# Patient Record
Sex: MALE | Race: WHITE | NOT HISPANIC OR LATINO | Employment: FULL TIME | URBAN - METROPOLITAN AREA
[De-identification: names, ages, dates, MRNs, and addresses within clinical notes are randomized per-mention and may not be internally consistent; named-entity substitution may affect disease eponyms.]

---

## 2019-04-13 ENCOUNTER — HOSPITAL ENCOUNTER (OUTPATIENT)
Dept: URGENT CARE | Facility: CLINIC | Age: 49
Discharge: HOME | End: 2019-04-13

## 2021-04-16 ENCOUNTER — OFFICE VISIT CONVERTED (OUTPATIENT)
Dept: FAMILY MEDICINE CLINIC | Facility: CLINIC | Age: 51
End: 2021-04-16
Attending: NURSE PRACTITIONER

## 2021-04-16 ENCOUNTER — HOSPITAL ENCOUNTER (OUTPATIENT)
Dept: LAB | Facility: HOSPITAL | Age: 51
Discharge: HOME | End: 2021-04-16
Attending: NURSE PRACTITIONER

## 2021-04-16 ENCOUNTER — HOSPITAL ENCOUNTER (OUTPATIENT)
Dept: GENERAL RADIOLOGY | Facility: HOSPITAL | Age: 51
Discharge: HOME | End: 2021-04-16
Attending: NURSE PRACTITIONER

## 2021-04-16 LAB
ALBUMIN SERPL-MCNC: 4.3 G/DL (ref 3.5–5)
ALBUMIN/GLOB SERPL: 1.4 {RATIO} (ref 1.4–2.6)
ALP SERPL-CCNC: 78 U/L (ref 56–119)
ALT SERPL-CCNC: 22 U/L (ref 10–40)
ANION GAP SERPL CALC-SCNC: 21 MMOL/L (ref 8–19)
AST SERPL-CCNC: 22 U/L (ref 15–50)
BASOPHILS # BLD AUTO: 0.05 10*3/UL (ref 0–0.2)
BASOPHILS NFR BLD AUTO: 0.5 % (ref 0–3)
BILIRUB SERPL-MCNC: 0.32 MG/DL (ref 0.2–1.3)
BNP SERPL-MCNC: 11 PG/ML (ref 0–900)
BUN SERPL-MCNC: 16 MG/DL (ref 5–25)
BUN/CREAT SERPL: 23 {RATIO} (ref 6–20)
CALCIUM SERPL-MCNC: 9.1 MG/DL (ref 8.7–10.4)
CHLORIDE SERPL-SCNC: 107 MMOL/L (ref 99–111)
CHOLEST SERPL-MCNC: 224 MG/DL (ref 107–200)
CHOLEST/HDLC SERPL: 5.1 {RATIO} (ref 3–6)
CONV ABS IMM GRAN: 0.05 10*3/UL (ref 0–0.2)
CONV CO2: 20 MMOL/L (ref 22–32)
CONV IMMATURE GRAN: 0.5 % (ref 0–1.8)
CONV TOTAL PROTEIN: 7.4 G/DL (ref 6.3–8.2)
CREAT UR-MCNC: 0.7 MG/DL (ref 0.7–1.2)
DEPRECATED RDW RBC AUTO: 41.1 FL (ref 35.1–43.9)
EOSINOPHIL # BLD AUTO: 0.13 10*3/UL (ref 0–0.7)
EOSINOPHIL # BLD AUTO: 1.3 % (ref 0–7)
ERYTHROCYTE [DISTWIDTH] IN BLOOD BY AUTOMATED COUNT: 12.6 % (ref 11.6–14.4)
GFR SERPLBLD BASED ON 1.73 SQ M-ARVRAT: >60 ML/MIN/{1.73_M2}
GLOBULIN UR ELPH-MCNC: 3.1 G/DL (ref 2–3.5)
GLUCOSE SERPL-MCNC: 92 MG/DL (ref 70–99)
HCT VFR BLD AUTO: 46.4 % (ref 42–52)
HDLC SERPL-MCNC: 44 MG/DL (ref 40–60)
HGB BLD-MCNC: 15.5 G/DL (ref 14–18)
LDLC SERPL CALC-MCNC: 141 MG/DL (ref 70–100)
LYMPHOCYTES # BLD AUTO: 2.92 10*3/UL (ref 1–5)
LYMPHOCYTES NFR BLD AUTO: 30 % (ref 20–45)
MCH RBC QN AUTO: 29.8 PG (ref 27–31)
MCHC RBC AUTO-ENTMCNC: 33.4 G/DL (ref 33–37)
MCV RBC AUTO: 89.1 FL (ref 80–96)
MONOCYTES # BLD AUTO: 0.56 10*3/UL (ref 0.2–1.2)
MONOCYTES NFR BLD AUTO: 5.8 % (ref 3–10)
NEUTROPHILS # BLD AUTO: 6.02 10*3/UL (ref 2–8)
NEUTROPHILS NFR BLD AUTO: 61.9 % (ref 30–85)
NRBC CBCN: 0 % (ref 0–0.7)
OSMOLALITY SERPL CALC.SUM OF ELEC: 299 MOSM/KG (ref 273–304)
PLATELET # BLD AUTO: 336 10*3/UL (ref 130–400)
PMV BLD AUTO: 9.7 FL (ref 9.4–12.4)
POTASSIUM SERPL-SCNC: 4.3 MMOL/L (ref 3.5–5.3)
PSA SERPL-MCNC: 0.24 NG/ML (ref 0–4)
RBC # BLD AUTO: 5.21 10*6/UL (ref 4.7–6.1)
SODIUM SERPL-SCNC: 144 MMOL/L (ref 135–147)
TRIGL SERPL-MCNC: 197 MG/DL (ref 40–150)
TSH SERPL-ACNC: 1.76 M[IU]/L (ref 0.27–4.2)
VLDLC SERPL-MCNC: 39 MG/DL (ref 5–37)
WBC # BLD AUTO: 9.73 10*3/UL (ref 4.8–10.8)
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
## 2021-05-14 VITALS
RESPIRATION RATE: 18 BRPM | TEMPERATURE: 98.1 F | OXYGEN SATURATION: 96 % | BODY MASS INDEX: 40.69 KG/M2 | HEART RATE: 76 BPM | SYSTOLIC BLOOD PRESSURE: 140 MMHG | DIASTOLIC BLOOD PRESSURE: 76 MMHG | WEIGHT: 300.37 LBS | HEIGHT: 72 IN

## 2021-05-19 ENCOUNTER — HOSPITAL ENCOUNTER (OUTPATIENT)
Dept: CARDIOLOGY | Facility: HOSPITAL | Age: 51
Discharge: HOME | End: 2021-05-19
Attending: NURSE PRACTITIONER

## 2022-01-21 ENCOUNTER — OFFICE VISIT (OUTPATIENT)
Dept: FAMILY MEDICINE CLINIC | Facility: CLINIC | Age: 52
End: 2022-01-21

## 2022-01-21 VITALS
HEART RATE: 78 BPM | TEMPERATURE: 97.6 F | BODY MASS INDEX: 42.66 KG/M2 | SYSTOLIC BLOOD PRESSURE: 134 MMHG | DIASTOLIC BLOOD PRESSURE: 76 MMHG | OXYGEN SATURATION: 98 % | HEIGHT: 72 IN | WEIGHT: 315 LBS

## 2022-01-21 DIAGNOSIS — F17.200 TOBACCO USE DISORDER: ICD-10-CM

## 2022-01-21 DIAGNOSIS — J44.9 CHRONIC OBSTRUCTIVE PULMONARY DISEASE, UNSPECIFIED COPD TYPE: ICD-10-CM

## 2022-01-21 DIAGNOSIS — I10 PRIMARY HYPERTENSION: ICD-10-CM

## 2022-01-21 DIAGNOSIS — E78.2 MIXED HYPERLIPIDEMIA: ICD-10-CM

## 2022-01-21 DIAGNOSIS — R73.01 IMPAIRED FASTING GLUCOSE: ICD-10-CM

## 2022-01-21 DIAGNOSIS — K21.9 GASTROESOPHAGEAL REFLUX DISEASE WITHOUT ESOPHAGITIS: ICD-10-CM

## 2022-01-21 DIAGNOSIS — Z12.11 SCREENING FOR COLON CANCER: ICD-10-CM

## 2022-01-21 DIAGNOSIS — Z12.5 SCREENING FOR PROSTATE CANCER: ICD-10-CM

## 2022-01-21 DIAGNOSIS — R11.2 NAUSEA AND VOMITING, INTRACTABILITY OF VOMITING NOT SPECIFIED, UNSPECIFIED VOMITING TYPE: ICD-10-CM

## 2022-01-21 DIAGNOSIS — Z13.29 SCREENING FOR THYROID DISORDER: ICD-10-CM

## 2022-01-21 PROBLEM — E88.810 METABOLIC SYNDROME: Status: ACTIVE | Noted: 2022-01-21

## 2022-01-21 PROBLEM — N20.0 KIDNEY STONES: Status: ACTIVE | Noted: 2022-01-21

## 2022-01-21 PROBLEM — E88.81 METABOLIC SYNDROME: Status: ACTIVE | Noted: 2022-01-21

## 2022-01-21 LAB
ALBUMIN SERPL-MCNC: 4.6 G/DL (ref 3.5–5.2)
ALBUMIN/GLOB SERPL: 1.6 G/DL
ALP SERPL-CCNC: 68 U/L (ref 39–117)
ALT SERPL W P-5'-P-CCNC: 31 U/L (ref 1–41)
AMYLASE SERPL-CCNC: 46 U/L (ref 28–100)
ANION GAP SERPL CALCULATED.3IONS-SCNC: 9.5 MMOL/L (ref 5–15)
AST SERPL-CCNC: 28 U/L (ref 1–40)
BASOPHILS # BLD AUTO: 0.05 10*3/MM3 (ref 0–0.2)
BASOPHILS NFR BLD AUTO: 0.4 % (ref 0–1.5)
BILIRUB SERPL-MCNC: 0.5 MG/DL (ref 0–1.2)
BUN SERPL-MCNC: 13 MG/DL (ref 6–20)
BUN/CREAT SERPL: 16.9 (ref 7–25)
CALCIUM SPEC-SCNC: 9.3 MG/DL (ref 8.6–10.5)
CHLORIDE SERPL-SCNC: 102 MMOL/L (ref 98–107)
CHOLEST SERPL-MCNC: 240 MG/DL (ref 0–200)
CO2 SERPL-SCNC: 28.5 MMOL/L (ref 22–29)
CREAT SERPL-MCNC: 0.77 MG/DL (ref 0.76–1.27)
DEPRECATED RDW RBC AUTO: 40 FL (ref 37–54)
EOSINOPHIL # BLD AUTO: 0.11 10*3/MM3 (ref 0–0.4)
EOSINOPHIL NFR BLD AUTO: 0.9 % (ref 0.3–6.2)
ERYTHROCYTE [DISTWIDTH] IN BLOOD BY AUTOMATED COUNT: 12.3 % (ref 12.3–15.4)
GFR SERPL CREATININE-BSD FRML MDRD: 107 ML/MIN/1.73
GLOBULIN UR ELPH-MCNC: 2.9 GM/DL
GLUCOSE SERPL-MCNC: 90 MG/DL (ref 65–99)
HBA1C MFR BLD: 6.02 % (ref 4.8–5.6)
HCT VFR BLD AUTO: 44.6 % (ref 37.5–51)
HDLC SERPL-MCNC: 35 MG/DL (ref 40–60)
HGB BLD-MCNC: 15 G/DL (ref 13–17.7)
IMM GRANULOCYTES # BLD AUTO: 0.06 10*3/MM3 (ref 0–0.05)
IMM GRANULOCYTES NFR BLD AUTO: 0.5 % (ref 0–0.5)
LDLC SERPL CALC-MCNC: 162 MG/DL (ref 0–100)
LDLC/HDLC SERPL: 4.54 {RATIO}
LIPASE SERPL-CCNC: 20 U/L (ref 13–60)
LYMPHOCYTES # BLD AUTO: 3.54 10*3/MM3 (ref 0.7–3.1)
LYMPHOCYTES NFR BLD AUTO: 30.1 % (ref 19.6–45.3)
MCH RBC QN AUTO: 29.7 PG (ref 26.6–33)
MCHC RBC AUTO-ENTMCNC: 33.6 G/DL (ref 31.5–35.7)
MCV RBC AUTO: 88.3 FL (ref 79–97)
MONOCYTES # BLD AUTO: 0.62 10*3/MM3 (ref 0.1–0.9)
MONOCYTES NFR BLD AUTO: 5.3 % (ref 5–12)
NEUTROPHILS NFR BLD AUTO: 62.8 % (ref 42.7–76)
NEUTROPHILS NFR BLD AUTO: 7.4 10*3/MM3 (ref 1.7–7)
NRBC BLD AUTO-RTO: 0 /100 WBC (ref 0–0.2)
PLATELET # BLD AUTO: 349 10*3/MM3 (ref 140–450)
PMV BLD AUTO: 9.7 FL (ref 6–12)
POTASSIUM SERPL-SCNC: 4.3 MMOL/L (ref 3.5–5.2)
PROT SERPL-MCNC: 7.5 G/DL (ref 6–8.5)
PSA SERPL-MCNC: 0.19 NG/ML (ref 0–4)
RBC # BLD AUTO: 5.05 10*6/MM3 (ref 4.14–5.8)
SODIUM SERPL-SCNC: 140 MMOL/L (ref 136–145)
TRIGL SERPL-MCNC: 231 MG/DL (ref 0–150)
TSH SERPL DL<=0.05 MIU/L-ACNC: 1.44 UIU/ML (ref 0.27–4.2)
VLDLC SERPL-MCNC: 43 MG/DL (ref 5–40)
WBC NRBC COR # BLD: 11.78 10*3/MM3 (ref 3.4–10.8)

## 2022-01-21 PROCEDURE — 86677 HELICOBACTER PYLORI ANTIBODY: CPT | Performed by: NURSE PRACTITIONER

## 2022-01-21 PROCEDURE — 80061 LIPID PANEL: CPT | Performed by: NURSE PRACTITIONER

## 2022-01-21 PROCEDURE — 82150 ASSAY OF AMYLASE: CPT | Performed by: NURSE PRACTITIONER

## 2022-01-21 PROCEDURE — 83036 HEMOGLOBIN GLYCOSYLATED A1C: CPT | Performed by: NURSE PRACTITIONER

## 2022-01-21 PROCEDURE — G0103 PSA SCREENING: HCPCS | Performed by: NURSE PRACTITIONER

## 2022-01-21 PROCEDURE — 84443 ASSAY THYROID STIM HORMONE: CPT | Performed by: NURSE PRACTITIONER

## 2022-01-21 PROCEDURE — 36415 COLL VENOUS BLD VENIPUNCTURE: CPT | Performed by: NURSE PRACTITIONER

## 2022-01-21 PROCEDURE — 99214 OFFICE O/P EST MOD 30 MIN: CPT | Performed by: NURSE PRACTITIONER

## 2022-01-21 PROCEDURE — 83690 ASSAY OF LIPASE: CPT | Performed by: NURSE PRACTITIONER

## 2022-01-21 PROCEDURE — 80053 COMPREHEN METABOLIC PANEL: CPT | Performed by: NURSE PRACTITIONER

## 2022-01-21 PROCEDURE — 85025 COMPLETE CBC W/AUTO DIFF WBC: CPT | Performed by: NURSE PRACTITIONER

## 2022-01-21 RX ORDER — FAMOTIDINE 20 MG/1
20 TABLET, FILM COATED ORAL 2 TIMES DAILY PRN
Qty: 180 TABLET | Refills: 1 | Status: SHIPPED | OUTPATIENT
Start: 2022-01-21 | End: 2022-02-08

## 2022-01-21 NOTE — PATIENT INSTRUCTIONS
Gastroesophageal Reflux Disease, Adult    Gastroesophageal reflux (CHAMP) happens when acid from the stomach flows up into the tube that connects the mouth and the stomach (esophagus). Normally, food travels down the esophagus and stays in the stomach to be digested. With CHAMP, food and stomach acid sometimes move back up into the esophagus.  You may have a disease called gastroesophageal reflux disease (GERD) if the reflux:  · Happens often.  · Causes frequent or very bad symptoms.  · Causes problems such as damage to the esophagus.  When this happens, the esophagus becomes sore and swollen. Over time, GERD can make small holes (ulcers) in the lining of the esophagus.  What are the causes?  This condition is caused by a problem with the muscle between the esophagus and the stomach. When this muscle is weak or not normal, it does not close properly to keep food and acid from coming back up from the stomach.  The muscle can be weak because of:  · Tobacco use.  · Pregnancy.  · Having a certain type of hernia (hiatal hernia).  · Alcohol use.  · Certain foods and drinks, such as coffee, chocolate, onions, and peppermint.  What increases the risk?  · Being overweight.  · Having a disease that affects your connective tissue.  · Taking NSAIDs, such a ibuprofen.  What are the signs or symptoms?  · Heartburn.  · Difficult or painful swallowing.  · The feeling of having a lump in the throat.  · A bitter taste in the mouth.  · Bad breath.  · Having a lot of saliva.  · Having an upset or bloated stomach.  · Burping.  · Chest pain. Different conditions can cause chest pain. Make sure you see your doctor if you have chest pain.  · Shortness of breath or wheezing.  · A long-term cough or a cough at night.  · Wearing away of the surface of teeth (tooth enamel).  · Weight loss.  How is this treated?  · Making changes to your diet.  · Taking medicine.  · Having surgery.  Treatment will depend on how bad your symptoms are.  Follow these  instructions at home:  Eating and drinking    · Follow a diet as told by your doctor. You may need to avoid foods and drinks such as:  ? Coffee and tea, with or without caffeine.  ? Drinks that contain alcohol.  ? Energy drinks and sports drinks.  ? Bubbly (carbonated) drinks or sodas.  ? Chocolate and cocoa.  ? Peppermint and mint flavorings.  ? Garlic and onions.  ? Horseradish.  ? Spicy and acidic foods. These include peppers, chili powder, fuchs powder, vinegar, hot sauces, and BBQ sauce.  ? Citrus fruit juices and citrus fruits, such as oranges, chandrakant, and limes.  ? Tomato-based foods. These include red sauce, chili, salsa, and pizza with red sauce.  ? Fried and fatty foods. These include donuts, french fries, potato chips, and high-fat dressings.  ? High-fat meats. These include hot dogs, rib eye steak, sausage, ham, and banda.  ? High-fat dairy items, such as whole milk, butter, and cream cheese.  · Eat small meals often. Avoid eating large meals.  · Avoid drinking large amounts of liquid with your meals.  · Avoid eating meals during the 2-3 hours before bedtime.  · Avoid lying down right after you eat.  · Do not exercise right after you eat.    Lifestyle    · Do not smoke or use any products that contain nicotine or tobacco. If you need help quitting, ask your doctor.  · Try to lower your stress. If you need help doing this, ask your doctor.  · If you are overweight, lose an amount of weight that is healthy for you. Ask your doctor about a safe weight loss goal.    General instructions  · Pay attention to any changes in your symptoms.  · Take over-the-counter and prescription medicines only as told by your doctor.  · Do not take aspirin, ibuprofen, or other NSAIDs unless your doctor says it is okay.  · Wear loose clothes. Do not wear anything tight around your waist.  · Raise (elevate) the head of your bed about 6 inches (15 cm). You may need to use a wedge to do this.  · Avoid bending over if this makes  your symptoms worse.  · Keep all follow-up visits.  Contact a doctor if:  · You have new symptoms.  · You lose weight and you do not know why.  · You have trouble swallowing or it hurts to swallow.  · You have wheezing or a cough that keeps happening.  · You have a hoarse voice.  · Your symptoms do not get better with treatment.  Get help right away if:  · You have sudden pain in your arms, neck, jaw, teeth, or back.  · You suddenly feel sweaty, dizzy, or light-headed.  · You have chest pain or shortness of breath.  · You vomit and the vomit is green, yellow, or black, or it looks like blood or coffee grounds.  · You faint.  · Your poop (stool) is red, bloody, or black.  · You cannot swallow, drink, or eat.  These symptoms may represent a serious problem that is an emergency. Do not wait to see if the symptoms will go away. Get medical help right away. Call your local emergency services (911 in the [place] Do not drive yourself to the hospital.  Summary  · If a person has gastroesophageal reflux disease (GERD), food and stomach acid move back up into the esophagus and cause symptoms or problems such as damage to the esophagus.  · Treatment will depend on how bad your symptoms are.  · Follow a diet as told by your doctor.  · Take all medicines only as told by your doctor.  This information is not intended to replace advice given to you by your health care provider. Make sure you discuss any questions you have with your health care provider.  Document Revised: 06/28/2021 Document Reviewed: 06/28/2021  [employer] Patient Education © 2021 [employer]

## 2022-01-21 NOTE — PROGRESS NOTES
Follow Up Office Visit      Patient Name: Joseph Duane Green  : 1970   MRN: [id number]     Chief Complaint:    Chief Complaint   Patient presents with   • Anxiety   • Hypertension   • Allergies   • Depression   • Hyperlipidemia       History of Present Illness: Joseph Duane Green is a 51 y.o. male who is here today to follow up for HTN, hyperlipidemia, impaired fasting glucose, reflux anxiety and depression        Colonoscopy-never    Labs-    Subjective      Review of Systems:   Review of Systems   Constitutional: Negative for chills and fever.   HENT: Negative for ear pain, sinus pain and sore throat.    Respiratory: Negative for cough.    Cardiovascular: Negative for chest pain.   Gastrointestinal: Positive for nausea and vomiting. Negative for abdominal pain and diarrhea.   Genitourinary: Negative for dysuria.   Musculoskeletal: Negative for myalgias.   Neurological: Negative for headaches.        Past Medical History:   Past Medical History:   Diagnosis Date   • Allergic    • Anxiety    • Depression    • Hypertension    • Obesity        Past Surgical History: History reviewed. No pertinent surgical history.    Family History:   Family History   Problem Relation Age of Onset   • Heart disease Mother    • Hypertension Mother    • Cancer Maternal Grandfather    • Heart disease Paternal Grandmother        Social History:   Social History     Socioeconomic History   • Marital status: Single   Tobacco Use   • Smoking status: Former Smoker     Types: Cigarettes   • Smokeless tobacco: Never Used   Substance and Sexual Activity   • Alcohol use: Not Currently       Medications:     Current Outpatient Medications:   •  famotidine (Pepcid) 20 MG tablet, Take 1 tablet by mouth 2 (Two) Times a Day As Needed for Indigestion or Heartburn., Disp: 180 tablet, Rfl: 1    Allergies:   Allergies   Allergen Reactions   • Adhesive Tape Itching           Objective     Physical Exam:  Vital Signs:   Vitals:    22  "1359   BP: 134/76   Pulse: 78   Temp: 97.6 °F (36.4 °C)   SpO2: 98%   Weight: (!) 145 kg (320 lb)   Height: 182.9 cm (72\")     Body mass index is 43.4 kg/m².     Physical Exam  HENT:      Right Ear: Tympanic membrane normal.      Left Ear: Tympanic membrane normal.      Nose: Nose normal.      Mouth/Throat:      Mouth: Mucous membranes are moist.   Eyes:      Pupils: Pupils are equal, round, and reactive to light.   Neck:      Vascular: No carotid bruit.   Cardiovascular:      Rate and Rhythm: Normal rate and regular rhythm.      Pulses: Normal pulses.      Heart sounds: Normal heart sounds.   Pulmonary:      Effort: Pulmonary effort is normal.      Breath sounds: Normal breath sounds.   Abdominal:      General: Bowel sounds are normal.      Palpations: Abdomen is soft.   Musculoskeletal:      Right lower leg: No edema.      Left lower leg: No edema.   Neurological:      Mental Status: He is alert.   Psychiatric:         Mood and Affect: Mood normal.         Behavior: Behavior normal.             Assessment / Plan      Assessment/Plan:   Diagnoses and all orders for this visit:    1. Primary hypertension  -     CBC Auto Differential  -     Comprehensive Metabolic Panel    2. Mixed hyperlipidemia  -     Comprehensive Metabolic Panel  -     Lipid Panel    3. Impaired fasting glucose  -     Hemoglobin A1c    4. Screening for prostate cancer  -     PSA Screen    5. Screening for colon cancer  -     Ambulatory Referral For Screening Colonoscopy    6. Screening for thyroid disorder  -     TSH    7. Chronic obstructive pulmonary disease, unspecified COPD type (HCC)    8. Tobacco use disorder    9. Gastroesophageal reflux disease without esophagitis  -     Ambulatory Referral to General Surgery  -     Amylase  -     Lipase  -     Helicobacter Pylori, IgA IgG IgM    10. Nausea and vomiting, intractability of vomiting not specified, unspecified vomiting type  -     Amylase  -     Lipase  -     Helicobacter Pylori, IgA IgG " "IgM    Other orders  -     famotidine (Pepcid) 20 MG tablet; Take 1 tablet by mouth 2 (Two) Times a Day As Needed for Indigestion or Heartburn.  Dispense: 180 tablet; Refill: 1       Hypertension controlled without medication at this time patient is previously controlled with diet and exercise continue exercise 30 minutes daily continue weight loss journey goal of losing 10% of his body weight  Hyperlipidemia we will obtain lipid panel to monitor recommend decrease fried foods red meat pork products cheese increase fruits vegetables whole grains  Impaired fasting glucose obtain hemoglobin A1c to monitor recommend decrease carb intake increase exercise to 30 minutes daily  Reflux occurring a few times per week we will add Pepcid 20 mg twice daily as needed will obtain labs call with results we will provide order for general surgery for endoscopy screening colonoscopy  COPD stable no wheezing no use of inhalers at this time        Follow Up:   Return in about 6 months (around 7/21/2022).    SAV Shah    \"Please note that portions of this note were completed with a voice recognition program.\"    "

## 2022-01-24 LAB
H PYLORI IGA SER-ACNC: <9 UNITS (ref 0–8.9)
H PYLORI IGG SER IA-ACNC: 0.43 INDEX VALUE (ref 0–0.79)
H PYLORI IGM SER-ACNC: <9 UNITS (ref 0–8.9)

## 2022-01-26 ENCOUNTER — TELEPHONE (OUTPATIENT)
Dept: FAMILY MEDICINE CLINIC | Facility: CLINIC | Age: 52
End: 2022-01-26

## 2022-01-26 NOTE — TELEPHONE ENCOUNTER
Notified patient of results and made a 3 month follow up

## 2022-01-26 NOTE — TELEPHONE ENCOUNTER
----- Message from SAV Ye sent at 1/24/2022  8:52 AM EST -----  The 10-year ASCVD risk score (Meme TORRES Jr., et al., 2013) is: 7.6%    Values used to calculate the score:      Age: 51 years      Sex: Male      Is Non- : No      Diabetic: No      Tobacco smoker: No      Systolic Blood Pressure: 134 mmHg      Is BP treated: No      HDL Cholesterol: 35 mg/dL      Total Cholesterol: 240 mg/dL    Cholesterol is elevated continues to rise LDL or bad cholesterol is 162 recommend cut out fried foods red meat pork cheese increase fruits vegetables whole grains beans nuts recheck in 90 days if remains elevated will start Lipitor 20 mg at nighttime recheck CBC CMP and lipid panel in 90 daysAverage blood sugar elevated at risk for diabetes but does not develop diabetes at this time recheck hemoglobin A1c in 90 days with a follow-up appointment in 90 days

## 2022-04-15 ENCOUNTER — OFFICE VISIT (OUTPATIENT)
Dept: FAMILY MEDICINE CLINIC | Facility: CLINIC | Age: 52
End: 2022-04-15

## 2022-04-15 VITALS
SYSTOLIC BLOOD PRESSURE: 123 MMHG | HEIGHT: 72 IN | DIASTOLIC BLOOD PRESSURE: 83 MMHG | HEART RATE: 70 BPM | TEMPERATURE: 97.6 F | BODY MASS INDEX: 42.66 KG/M2 | OXYGEN SATURATION: 99 % | WEIGHT: 315 LBS

## 2022-04-15 DIAGNOSIS — I10 PRIMARY HYPERTENSION: ICD-10-CM

## 2022-04-15 DIAGNOSIS — E88.81 METABOLIC SYNDROME: ICD-10-CM

## 2022-04-15 DIAGNOSIS — Z11.59 NEED FOR HEPATITIS C SCREENING TEST: ICD-10-CM

## 2022-04-15 DIAGNOSIS — R73.01 IMPAIRED FASTING GLUCOSE: ICD-10-CM

## 2022-04-15 DIAGNOSIS — E78.2 MIXED HYPERLIPIDEMIA: ICD-10-CM

## 2022-04-15 DIAGNOSIS — Z12.11 SCREENING FOR MALIGNANT NEOPLASM OF COLON: ICD-10-CM

## 2022-04-15 DIAGNOSIS — M54.12 CERVICAL RADICULITIS: ICD-10-CM

## 2022-04-15 DIAGNOSIS — Z13.29 SCREENING FOR THYROID DISORDER: ICD-10-CM

## 2022-04-15 DIAGNOSIS — M50.30 DDD (DEGENERATIVE DISC DISEASE), CERVICAL: ICD-10-CM

## 2022-04-15 DIAGNOSIS — Z79.899 MEDICATION MANAGEMENT: ICD-10-CM

## 2022-04-15 DIAGNOSIS — M54.2 NECK PAIN: ICD-10-CM

## 2022-04-15 LAB
ALBUMIN SERPL-MCNC: 4.5 G/DL (ref 3.5–5.2)
ALBUMIN/GLOB SERPL: 1.8 G/DL
ALP SERPL-CCNC: 70 U/L (ref 39–117)
ALT SERPL W P-5'-P-CCNC: 27 U/L (ref 1–41)
AMPHET+METHAMPHET UR QL: NEGATIVE
ANION GAP SERPL CALCULATED.3IONS-SCNC: 14 MMOL/L (ref 5–15)
AST SERPL-CCNC: 24 U/L (ref 1–40)
BARBITURATES UR QL SCN: NEGATIVE
BASOPHILS # BLD AUTO: 0.06 10*3/MM3 (ref 0–0.2)
BASOPHILS NFR BLD AUTO: 0.7 % (ref 0–1.5)
BENZODIAZ UR QL SCN: NEGATIVE
BILIRUB SERPL-MCNC: 0.3 MG/DL (ref 0–1.2)
BUN SERPL-MCNC: 13 MG/DL (ref 6–20)
BUN/CREAT SERPL: 19.4 (ref 7–25)
CALCIUM SPEC-SCNC: 8.8 MG/DL (ref 8.6–10.5)
CANNABINOIDS SERPL QL: NEGATIVE
CHLORIDE SERPL-SCNC: 105 MMOL/L (ref 98–107)
CHOLEST SERPL-MCNC: 216 MG/DL (ref 0–200)
CO2 SERPL-SCNC: 21 MMOL/L (ref 22–29)
COCAINE UR QL: NEGATIVE
CREAT SERPL-MCNC: 0.67 MG/DL (ref 0.76–1.27)
DEPRECATED RDW RBC AUTO: 37.9 FL (ref 37–54)
EGFRCR SERPLBLD CKD-EPI 2021: 112.3 ML/MIN/1.73
EOSINOPHIL # BLD AUTO: 0.08 10*3/MM3 (ref 0–0.4)
EOSINOPHIL NFR BLD AUTO: 0.9 % (ref 0.3–6.2)
ERYTHROCYTE [DISTWIDTH] IN BLOOD BY AUTOMATED COUNT: 12.3 % (ref 12.3–15.4)
GLOBULIN UR ELPH-MCNC: 2.5 GM/DL
GLUCOSE SERPL-MCNC: 92 MG/DL (ref 65–99)
HBA1C MFR BLD: 5.4 % (ref 4.8–5.6)
HCT VFR BLD AUTO: 45 % (ref 37.5–51)
HCV AB SER DONR QL: NORMAL
HDLC SERPL-MCNC: 39 MG/DL (ref 40–60)
HGB BLD-MCNC: 15.5 G/DL (ref 13–17.7)
IMM GRANULOCYTES # BLD AUTO: 0.06 10*3/MM3 (ref 0–0.05)
IMM GRANULOCYTES NFR BLD AUTO: 0.7 % (ref 0–0.5)
LDLC SERPL CALC-MCNC: 141 MG/DL (ref 0–100)
LDLC/HDLC SERPL: 3.53 {RATIO}
LYMPHOCYTES # BLD AUTO: 2.46 10*3/MM3 (ref 0.7–3.1)
LYMPHOCYTES NFR BLD AUTO: 27.7 % (ref 19.6–45.3)
MCH RBC QN AUTO: 29.6 PG (ref 26.6–33)
MCHC RBC AUTO-ENTMCNC: 34.4 G/DL (ref 31.5–35.7)
MCV RBC AUTO: 85.9 FL (ref 79–97)
METHADONE UR QL SCN: NEGATIVE
MONOCYTES # BLD AUTO: 0.57 10*3/MM3 (ref 0.1–0.9)
MONOCYTES NFR BLD AUTO: 6.4 % (ref 5–12)
NEUTROPHILS NFR BLD AUTO: 5.65 10*3/MM3 (ref 1.7–7)
NEUTROPHILS NFR BLD AUTO: 63.6 % (ref 42.7–76)
NRBC BLD AUTO-RTO: 0 /100 WBC (ref 0–0.2)
OPIATES UR QL: NEGATIVE
OXYCODONE UR QL SCN: NEGATIVE
PLATELET # BLD AUTO: 341 10*3/MM3 (ref 140–450)
PMV BLD AUTO: 9.6 FL (ref 6–12)
POTASSIUM SERPL-SCNC: 4.6 MMOL/L (ref 3.5–5.2)
PROT SERPL-MCNC: 7 G/DL (ref 6–8.5)
RBC # BLD AUTO: 5.24 10*6/MM3 (ref 4.14–5.8)
SODIUM SERPL-SCNC: 140 MMOL/L (ref 136–145)
TRIGL SERPL-MCNC: 197 MG/DL (ref 0–150)
TSH SERPL DL<=0.05 MIU/L-ACNC: 1.87 UIU/ML (ref 0.27–4.2)
VLDLC SERPL-MCNC: 36 MG/DL (ref 5–40)
WBC NRBC COR # BLD: 8.88 10*3/MM3 (ref 3.4–10.8)

## 2022-04-15 PROCEDURE — 80307 DRUG TEST PRSMV CHEM ANLYZR: CPT | Performed by: NURSE PRACTITIONER

## 2022-04-15 PROCEDURE — 99213 OFFICE O/P EST LOW 20 MIN: CPT | Performed by: NURSE PRACTITIONER

## 2022-04-15 PROCEDURE — 80053 COMPREHEN METABOLIC PANEL: CPT | Performed by: NURSE PRACTITIONER

## 2022-04-15 PROCEDURE — 84443 ASSAY THYROID STIM HORMONE: CPT | Performed by: NURSE PRACTITIONER

## 2022-04-15 PROCEDURE — 85025 COMPLETE CBC W/AUTO DIFF WBC: CPT | Performed by: NURSE PRACTITIONER

## 2022-04-15 PROCEDURE — 36415 COLL VENOUS BLD VENIPUNCTURE: CPT | Performed by: NURSE PRACTITIONER

## 2022-04-15 PROCEDURE — 86803 HEPATITIS C AB TEST: CPT | Performed by: NURSE PRACTITIONER

## 2022-04-15 PROCEDURE — 83036 HEMOGLOBIN GLYCOSYLATED A1C: CPT | Performed by: NURSE PRACTITIONER

## 2022-04-15 PROCEDURE — 80061 LIPID PANEL: CPT | Performed by: NURSE PRACTITIONER

## 2022-04-15 RX ORDER — GABAPENTIN 300 MG/1
300 CAPSULE ORAL 3 TIMES DAILY
Qty: 270 CAPSULE | Refills: 1 | Status: SHIPPED | OUTPATIENT
Start: 2022-04-15 | End: 2023-03-21 | Stop reason: SDUPTHER

## 2022-04-15 RX ORDER — MELOXICAM 15 MG/1
15 TABLET ORAL DAILY
Qty: 90 TABLET | Refills: 1 | Status: SHIPPED | OUTPATIENT
Start: 2022-04-15 | End: 2023-03-21 | Stop reason: SDUPTHER

## 2022-04-15 NOTE — PROGRESS NOTES
Follow Up Office Visit      Patient Name: Joseph Duane Green  : 1970   MRN: [id number]     Chief Complaint:    Chief Complaint   Patient presents with   • Hypertension   • Hyperlipidemia   • Anxiety   • Arthritis   • Depression   • Hyperglycemia   • Neck Pain       History of Present Illness: Joseph Duane Green is a 52 y.o. male who is here today to  Follow up for anxiety, hypertension, allergies, anxiety, hyperlipidemia, IFG, neck pain and depression    Pt requests to be placed back on gabapentin for neck pain and radiculitis its been at least 4 years since taken this medication     Also refill of mobic for joint pain and neck pain     Colonoscopy none   PSA     Subjective      Review of Systems:   Review of Systems   Constitutional: Negative for fatigue.   HENT: Negative for ear pain, sinus pain and sore throat.    Eyes: Negative for visual disturbance.   Respiratory: Negative for cough and shortness of breath.    Cardiovascular: Negative for chest pain.   Gastrointestinal: Negative for vomiting.   Endocrine: Negative for polydipsia and polyuria.   Genitourinary: Negative for dysuria.   Musculoskeletal: Positive for neck pain.   Allergic/Immunologic: Positive for environmental allergies.   Neurological: Positive for numbness.   Psychiatric/Behavioral: The patient is not nervous/anxious.         Past Medical History:   Past Medical History:   Diagnosis Date   • Allergic    • Anxiety    • Depression    • GERD (gastroesophageal reflux disease)    • Hypertension    • Obesity        Past Surgical History:   Past Surgical History:   Procedure Laterality Date   • FRACTURE SURGERY Right     leg       Family History:   Family History   Problem Relation Age of Onset   • Heart disease Mother    • Hypertension Mother    • Cancer Maternal Grandfather    • Heart disease Paternal Grandmother        Social History:   Social History     Socioeconomic History   • Marital status: Single   Tobacco Use   • Smoking  "status: Former Smoker     Types: Cigarettes   • Smokeless tobacco: Never Used   Vaping Use   • Vaping Use: Never used   Substance and Sexual Activity   • Alcohol use: Not Currently       Medications:     Current Outpatient Medications:   •  meloxicam (MOBIC) 15 MG tablet, Take 1 tablet by mouth Daily., Disp: 90 tablet, Rfl: 1  •  gabapentin (NEURONTIN) 300 MG capsule, Take 1 capsule by mouth 3 (Three) Times a Day., Disp: 270 capsule, Rfl: 1    Allergies:   Allergies   Allergen Reactions   • Adhesive Tape Itching           Objective     Physical Exam:  Vital Signs:   Vitals:    04/15/22 0803   BP: 123/83   Pulse: 70   Temp: 97.6 °F (36.4 °C)   SpO2: 99%   Weight: (!) 145 kg (320 lb)   Height: 182.9 cm (72\")     Body mass index is 43.4 kg/m².     Physical Exam  HENT:      Right Ear: Tympanic membrane normal.      Left Ear: Tympanic membrane normal.      Nose: Nose normal.      Mouth/Throat:      Mouth: Mucous membranes are moist.   Eyes:      Conjunctiva/sclera: Conjunctivae normal.   Neck:      Vascular: No carotid bruit.     Cardiovascular:      Rate and Rhythm: Normal rate and regular rhythm.      Heart sounds: Normal heart sounds. No murmur heard.  Pulmonary:      Effort: Pulmonary effort is normal.      Breath sounds: Normal breath sounds.   Abdominal:      General: Bowel sounds are normal.      Palpations: Abdomen is soft.   Musculoskeletal:      Cervical back: Muscular tenderness present.   Neurological:      Mental Status: He is alert.             Assessment / Plan      Assessment/Plan:   Diagnoses and all orders for this visit:    1. Impaired fasting glucose  -     Hemoglobin A1c    2. Mixed hyperlipidemia  -     Comprehensive Metabolic Panel  -     Lipid Panel    3. Primary hypertension  -     CBC Auto Differential    4. Metabolic syndrome    5. Neck pain  -     meloxicam (MOBIC) 15 MG tablet; Take 1 tablet by mouth Daily.  Dispense: 90 tablet; Refill: 1  -     gabapentin (NEURONTIN) 300 MG capsule; Take 1 " "capsule by mouth 3 (Three) Times a Day.  Dispense: 270 capsule; Refill: 1    6. Cervical radiculitis  -     gabapentin (NEURONTIN) 300 MG capsule; Take 1 capsule by mouth 3 (Three) Times a Day.  Dispense: 270 capsule; Refill: 1    7. DDD (degenerative disc disease), cervical    8. Medication management  -     Urine Drug Screen - Urine, Clean Catch; Future  -     Urine Drug Screen - Urine, Clean Catch    9. Screening for thyroid disorder  -     TSH    10. Screening for malignant neoplasm of colon  -     Ambulatory Referral For Screening Colonoscopy    11. Need for hepatitis C screening test  -     Hepatitis C Antibody       Impaired fasting glucose obtain hemoglobin A1c to monitor recommend decrease carb intake exercise 30 minutes daily  Hyperlipidemia we will obtain lipid  recommend decrease fried foods red meat pork products exercise 30 minutes daily weight loss  Neck pain cervical radiculitis will provide refill gabapentin Gino reviewed urine drug screen obtained in office will also provide refill of meloxicam as needed use only take with food            Follow Up:   Return in about 6 months (around 10/15/2022).    Zonia Barnes, SAV    \"Please note that portions of this note were completed with a voice recognition program.\"    "

## 2022-09-30 PROCEDURE — U0004 COV-19 TEST NON-CDC HGH THRU: HCPCS | Performed by: FAMILY MEDICINE

## 2022-12-15 DIAGNOSIS — M54.12 CERVICAL RADICULITIS: ICD-10-CM

## 2022-12-15 DIAGNOSIS — M54.2 NECK PAIN: ICD-10-CM

## 2022-12-16 RX ORDER — GABAPENTIN 300 MG/1
CAPSULE ORAL
Qty: 270 CAPSULE | Refills: 1 | OUTPATIENT
Start: 2022-12-16

## 2023-03-21 ENCOUNTER — HOSPITAL ENCOUNTER (OUTPATIENT)
Dept: GENERAL RADIOLOGY | Facility: HOSPITAL | Age: 53
Discharge: HOME | End: 2023-03-21
Admitting: NURSE PRACTITIONER
Payer: COMMERCIAL

## 2023-03-21 ENCOUNTER — OFFICE VISIT (OUTPATIENT)
Dept: FAMILY MEDICINE CLINIC | Facility: CLINIC | Age: 53
End: 2023-03-21
Payer: COMMERCIAL

## 2023-03-21 VITALS
DIASTOLIC BLOOD PRESSURE: 68 MMHG | HEART RATE: 75 BPM | TEMPERATURE: 97.9 F | SYSTOLIC BLOOD PRESSURE: 127 MMHG | OXYGEN SATURATION: 97 % | HEIGHT: 72 IN | WEIGHT: 315 LBS | BODY MASS INDEX: 42.66 KG/M2

## 2023-03-21 DIAGNOSIS — K21.9 GASTROESOPHAGEAL REFLUX DISEASE WITHOUT ESOPHAGITIS: ICD-10-CM

## 2023-03-21 DIAGNOSIS — J44.9 CHRONIC OBSTRUCTIVE PULMONARY DISEASE, UNSPECIFIED COPD TYPE: ICD-10-CM

## 2023-03-21 DIAGNOSIS — J30.9 ALLERGIC RHINITIS, UNSPECIFIED SEASONALITY, UNSPECIFIED TRIGGER: ICD-10-CM

## 2023-03-21 DIAGNOSIS — Z13.29 SCREENING FOR THYROID DISORDER: ICD-10-CM

## 2023-03-21 DIAGNOSIS — M54.12 CERVICAL RADICULITIS: ICD-10-CM

## 2023-03-21 DIAGNOSIS — M54.2 NECK PAIN: ICD-10-CM

## 2023-03-21 DIAGNOSIS — Z79.899 MEDICATION MANAGEMENT: ICD-10-CM

## 2023-03-21 DIAGNOSIS — I10 PRIMARY HYPERTENSION: ICD-10-CM

## 2023-03-21 DIAGNOSIS — Z12.11 SCREENING FOR MALIGNANT NEOPLASM OF COLON: ICD-10-CM

## 2023-03-21 DIAGNOSIS — E66.01 CLASS 3 SEVERE OBESITY DUE TO EXCESS CALORIES WITH SERIOUS COMORBIDITY AND BODY MASS INDEX (BMI) OF 40.0 TO 44.9 IN ADULT: ICD-10-CM

## 2023-03-21 DIAGNOSIS — R73.01 IMPAIRED FASTING GLUCOSE: ICD-10-CM

## 2023-03-21 DIAGNOSIS — E78.2 MIXED HYPERLIPIDEMIA: Primary | ICD-10-CM

## 2023-03-21 DIAGNOSIS — M25.512 ACUTE PAIN OF LEFT SHOULDER: ICD-10-CM

## 2023-03-21 DIAGNOSIS — Z87.891 HISTORY OF SMOKING 30 OR MORE PACK YEARS: ICD-10-CM

## 2023-03-21 DIAGNOSIS — Z12.5 SCREENING PSA (PROSTATE SPECIFIC ANTIGEN): ICD-10-CM

## 2023-03-21 DIAGNOSIS — M50.30 DDD (DEGENERATIVE DISC DISEASE), CERVICAL: ICD-10-CM

## 2023-03-21 LAB
ALBUMIN SERPL-MCNC: 4.4 G/DL (ref 3.5–5.2)
ALBUMIN/GLOB SERPL: 1.5 G/DL
ALP SERPL-CCNC: 69 U/L (ref 39–117)
ALT SERPL W P-5'-P-CCNC: 38 U/L (ref 1–41)
AMPHET+METHAMPHET UR QL: NEGATIVE
AMPHETAMINE INTERNAL CONTROL: NORMAL
AMPHETAMINES UR QL: NEGATIVE
ANION GAP SERPL CALCULATED.3IONS-SCNC: 11 MMOL/L (ref 5–15)
AST SERPL-CCNC: 38 U/L (ref 1–40)
BARBITURATE INTERNAL CONTROL: NORMAL
BARBITURATES UR QL SCN: NEGATIVE
BASOPHILS # BLD AUTO: 0.03 10*3/MM3 (ref 0–0.2)
BASOPHILS NFR BLD AUTO: 0.3 % (ref 0–1.5)
BENZODIAZ UR QL SCN: NEGATIVE
BENZODIAZEPINE INTERNAL CONTROL: NORMAL
BILIRUB SERPL-MCNC: 0.5 MG/DL (ref 0–1.2)
BILIRUB UR QL STRIP: NEGATIVE
BUN SERPL-MCNC: 14 MG/DL (ref 6–20)
BUN/CREAT SERPL: 17.3 (ref 7–25)
BUPRENORPHINE INTERNAL CONTROL: NORMAL
BUPRENORPHINE SERPL-MCNC: NEGATIVE NG/ML
CALCIUM SPEC-SCNC: 9.3 MG/DL (ref 8.6–10.5)
CANNABINOIDS SERPL QL: NEGATIVE
CHLORIDE SERPL-SCNC: 101 MMOL/L (ref 98–107)
CHOLEST SERPL-MCNC: 233 MG/DL (ref 0–200)
CLARITY UR: CLEAR
CO2 SERPL-SCNC: 26 MMOL/L (ref 22–29)
COCAINE INTERNAL CONTROL: NORMAL
COCAINE UR QL: NEGATIVE
COLOR UR: YELLOW
CREAT SERPL-MCNC: 0.81 MG/DL (ref 0.76–1.27)
DEPRECATED RDW RBC AUTO: 40.2 FL (ref 37–54)
EGFRCR SERPLBLD CKD-EPI 2021: 106.1 ML/MIN/1.73
EOSINOPHIL # BLD AUTO: 0.07 10*3/MM3 (ref 0–0.4)
EOSINOPHIL NFR BLD AUTO: 0.8 % (ref 0.3–6.2)
ERYTHROCYTE [DISTWIDTH] IN BLOOD BY AUTOMATED COUNT: 12.7 % (ref 12.3–15.4)
EXPIRATION DATE: NORMAL
GLOBULIN UR ELPH-MCNC: 3 GM/DL
GLUCOSE SERPL-MCNC: 91 MG/DL (ref 65–99)
GLUCOSE UR STRIP-MCNC: NEGATIVE MG/DL
HBA1C MFR BLD: 5.6 % (ref 4.8–5.6)
HCT VFR BLD AUTO: 45.8 % (ref 37.5–51)
HDLC SERPL-MCNC: 35 MG/DL (ref 40–60)
HGB BLD-MCNC: 15.1 G/DL (ref 13–17.7)
HGB UR QL STRIP.AUTO: NEGATIVE
IMM GRANULOCYTES # BLD AUTO: 0.05 10*3/MM3 (ref 0–0.05)
IMM GRANULOCYTES NFR BLD AUTO: 0.6 % (ref 0–0.5)
KETONES UR QL STRIP: NEGATIVE
LDLC SERPL CALC-MCNC: 154 MG/DL (ref 0–100)
LDLC/HDLC SERPL: 4.3 {RATIO}
LEUKOCYTE ESTERASE UR QL STRIP.AUTO: NEGATIVE
LYMPHOCYTES # BLD AUTO: 2.62 10*3/MM3 (ref 0.7–3.1)
LYMPHOCYTES NFR BLD AUTO: 29.9 % (ref 19.6–45.3)
Lab: NORMAL
MCH RBC QN AUTO: 29 PG (ref 26.6–33)
MCHC RBC AUTO-ENTMCNC: 33 G/DL (ref 31.5–35.7)
MCV RBC AUTO: 87.9 FL (ref 79–97)
MDMA (ECSTASY) INTERNAL CONTROL: NORMAL
MDMA UR QL SCN: NEGATIVE
METHADONE INTERNAL CONTROL: NORMAL
METHADONE UR QL SCN: NEGATIVE
METHAMPHETAMINE INTERNAL CONTROL: NORMAL
MONOCYTES # BLD AUTO: 0.49 10*3/MM3 (ref 0.1–0.9)
MONOCYTES NFR BLD AUTO: 5.6 % (ref 5–12)
NEUTROPHILS NFR BLD AUTO: 5.51 10*3/MM3 (ref 1.7–7)
NEUTROPHILS NFR BLD AUTO: 62.8 % (ref 42.7–76)
NITRITE UR QL STRIP: NEGATIVE
NRBC BLD AUTO-RTO: 0 /100 WBC (ref 0–0.2)
OPIATES INTERNAL CONTROL: NORMAL
OPIATES UR QL: NEGATIVE
OXYCODONE INTERNAL CONTROL: NORMAL
OXYCODONE UR QL SCN: NEGATIVE
PCP UR QL SCN: NEGATIVE
PH UR STRIP.AUTO: 5.5 [PH] (ref 5–8)
PHENCYCLIDINE INTERNAL CONTROL: NORMAL
PLATELET # BLD AUTO: 361 10*3/MM3 (ref 140–450)
PMV BLD AUTO: 9.8 FL (ref 6–12)
POTASSIUM SERPL-SCNC: 4.6 MMOL/L (ref 3.5–5.2)
PROT SERPL-MCNC: 7.4 G/DL (ref 6–8.5)
PROT UR QL STRIP: NEGATIVE
PSA SERPL-MCNC: 0.23 NG/ML (ref 0–4)
RBC # BLD AUTO: 5.21 10*6/MM3 (ref 4.14–5.8)
SODIUM SERPL-SCNC: 138 MMOL/L (ref 136–145)
SP GR UR STRIP: 1.01 (ref 1–1.03)
THC INTERNAL CONTROL: NORMAL
TRIGL SERPL-MCNC: 238 MG/DL (ref 0–150)
TSH SERPL DL<=0.05 MIU/L-ACNC: 2.18 UIU/ML (ref 0.27–4.2)
UROBILINOGEN UR QL STRIP: NORMAL
VLDLC SERPL-MCNC: 44 MG/DL (ref 5–40)
WBC NRBC COR # BLD: 8.77 10*3/MM3 (ref 3.4–10.8)

## 2023-03-21 PROCEDURE — G0103 PSA SCREENING: HCPCS | Performed by: NURSE PRACTITIONER

## 2023-03-21 PROCEDURE — 81003 URINALYSIS AUTO W/O SCOPE: CPT | Performed by: NURSE PRACTITIONER

## 2023-03-21 PROCEDURE — 73030 X-RAY EXAM OF SHOULDER: CPT

## 2023-03-21 PROCEDURE — 80061 LIPID PANEL: CPT | Performed by: NURSE PRACTITIONER

## 2023-03-21 PROCEDURE — 83036 HEMOGLOBIN GLYCOSYLATED A1C: CPT | Performed by: NURSE PRACTITIONER

## 2023-03-21 PROCEDURE — 80053 COMPREHEN METABOLIC PANEL: CPT | Performed by: NURSE PRACTITIONER

## 2023-03-21 PROCEDURE — 85025 COMPLETE CBC W/AUTO DIFF WBC: CPT | Performed by: NURSE PRACTITIONER

## 2023-03-21 PROCEDURE — 84443 ASSAY THYROID STIM HORMONE: CPT | Performed by: NURSE PRACTITIONER

## 2023-03-21 RX ORDER — MELOXICAM 15 MG/1
15 TABLET ORAL DAILY
Qty: 90 TABLET | Refills: 1 | Status: SHIPPED | OUTPATIENT
Start: 2023-03-21

## 2023-03-21 RX ORDER — GABAPENTIN 300 MG/1
300 CAPSULE ORAL 3 TIMES DAILY
Qty: 270 CAPSULE | Refills: 1 | Status: SHIPPED | OUTPATIENT
Start: 2023-03-21

## 2023-03-21 RX ORDER — LIDOCAINE 50 MG/G
3 PATCH TOPICAL EVERY 24 HOURS
Qty: 90 EACH | Refills: 5 | Status: SHIPPED | OUTPATIENT
Start: 2023-03-21

## 2023-03-21 NOTE — PROGRESS NOTES
Follow Up Office Visit      Patient Name: Joseph Duane Green  : 1970   MRN: [id number]     Chief Complaint:    Chief Complaint   Patient presents with   • Hypertension   • Hyperlipidemia   • Anxiety   • Depression   • Heartburn   • impaired fasting glucose   • COPD       History of Present Illness: Joseph Duane Green is a 52 y.o. male who is here today to follow up for HTN, hyperlipidemia, anxiety, depression, GERD, IFG, COPD.    Labs-2022  psa-,   Quit smoking in , smoking since age for 2 ppd for 30  Years     C/o He feels like he pulled something in his left shoulder. He noticed about a month ago it has been hurting sometimes radiating down to his hand and he has limited range of motion now. Unknown injury       Subjective      Review of Systems:   Review of Systems   Constitutional: Negative for fever.   HENT: Negative for ear pain and sore throat.    Respiratory: Negative for cough.    Cardiovascular: Negative for chest pain.   Gastrointestinal: Negative for abdominal pain, diarrhea, nausea and vomiting.   Genitourinary: Negative for dysuria.   Musculoskeletal: Positive for arthralgias and neck pain.        Past Medical History:   Past Medical History:   Diagnosis Date   • Allergic    • Anxiety    • Depression    • GERD (gastroesophageal reflux disease)    • Hypertension    • Obesity        Past Surgical History:   Past Surgical History:   Procedure Laterality Date   • FRACTURE SURGERY Right     leg       Family History:   Family History   Problem Relation Age of Onset   • Heart disease Mother    • Hypertension Mother    • Cancer Maternal Grandfather    • Heart disease Paternal Grandmother        Social History:   Social History     Socioeconomic History   • Marital status: Single   Tobacco Use   • Smoking status: Former     Types: Cigarettes   • Smokeless tobacco: Never   Vaping Use   • Vaping Use: Never used   Substance and Sexual Activity   • Alcohol use: Not Currently   • Drug use:  "Not Currently       Medications:     Current Outpatient Medications:   •  fluticasone (FLONASE) 50 MCG/ACT nasal spray, 2 sprays into the nostril(s) as directed by provider Daily., Disp: 18.2 mL, Rfl: 0  •  gabapentin (NEURONTIN) 300 MG capsule, Take 1 capsule by mouth 3 (Three) Times a Day., Disp: 270 capsule, Rfl: 1  •  meloxicam (MOBIC) 15 MG tablet, Take 1 tablet by mouth Daily., Disp: 90 tablet, Rfl: 1  •  lidocaine (LIDODERM) 5 %, Place 3 patches on the skin as directed by provider Daily. Remove & Discard patch within 12 hours or as directed by MD, Disp: 90 each, Rfl: 5    Allergies:   Allergies   Allergen Reactions   • Adhesive Tape Itching              Objective     Physical Exam:  Vital Signs:   Vitals:    03/21/23 0846   BP: 127/68   Pulse: 75   Temp: 97.9 °F (36.6 °C)   SpO2: 97%   Weight: (!) 147 kg (323 lb)   Height: 182.9 cm (72\")     Body mass index is 43.81 kg/m².     Physical Exam  HENT:      Right Ear: Tympanic membrane normal.      Left Ear: Tympanic membrane normal.      Nose: Nose normal.      Mouth/Throat:      Mouth: Mucous membranes are moist.   Eyes:      Conjunctiva/sclera: Conjunctivae normal.   Neck:      Vascular: No carotid bruit.   Cardiovascular:      Rate and Rhythm: Normal rate and regular rhythm.      Heart sounds: Normal heart sounds. No murmur heard.  Pulmonary:      Effort: Pulmonary effort is normal.      Breath sounds: Normal breath sounds.   Abdominal:      General: Bowel sounds are normal.      Palpations: Abdomen is soft.   Musculoskeletal:      Right lower leg: No edema.      Left lower leg: No edema.      Comments: Decrease range of motion left shoulder unable to touch top of head    Skin:     General: Skin is warm and dry.   Neurological:      Mental Status: He is alert.   Psychiatric:         Mood and Affect: Mood normal.         Behavior: Behavior normal.             Assessment / Plan      Assessment/Plan:   Diagnoses and all orders for this visit:    1. Mixed " hyperlipidemia (Primary)  -     Lipid Panel    2. Primary hypertension  -     CBC Auto Differential  -     Comprehensive Metabolic Panel    3. Impaired fasting glucose  -     Comprehensive Metabolic Panel  -     Hemoglobin A1c  -     Urinalysis With Culture If Indicated -    4. Gastroesophageal reflux disease without esophagitis    5. Cervical radiculitis  -     gabapentin (NEURONTIN) 300 MG capsule; Take 1 capsule by mouth 3 (Three) Times a Day.  Dispense: 270 capsule; Refill: 1    6. DDD (degenerative disc disease), cervical    7. Neck pain  -     gabapentin (NEURONTIN) 300 MG capsule; Take 1 capsule by mouth 3 (Three) Times a Day.  Dispense: 270 capsule; Refill: 1  -     meloxicam (MOBIC) 15 MG tablet; Take 1 tablet by mouth Daily.  Dispense: 90 tablet; Refill: 1    8. Chronic obstructive pulmonary disease, unspecified COPD type ([facility])    9. Allergic rhinitis, unspecified seasonality, unspecified trigger    10. Acute pain of left shoulder  -     XR Shoulder 2+ View Left; Future  -     Ambulatory Referral to Physical Therapy Evaluate and treat    11. Class 3 severe obesity due to excess calories with serious comorbidity and body mass index (BMI) of 40.0 to 44.9 in adult ([facility])    12. History of smoking 30 or more pack years    13. Medication management  -     POC Urine Drug Screen [facility]-C[facility]    14. Screening for malignant neoplasm of colon  -     Ambulatory Referral For Screening Colonoscopy    15. Screening PSA (prostate specific antigen)  -     PSA Screen    16. Screening for thyroid disorder  -     TSH    Other orders  -     lidocaine (LIDODERM) 5 %; Place 3 patches on the skin as directed by provider Daily. Remove & Discard patch within 12 hours or as directed by MD  Dispense: 90 each; Refill: 5           Hyperlipidemia we will obtain lipid panel to monitor recommend reduce fried foods red meat pork products cheese increase fruits vegetables whole grains  Hypertension currently controlled without medication  "at this time  Continue to monitor reduce salt intake exercise 30 minutes daily and weight loss  Class III severe obesity due to excess calorie intake with serious comorbidity of hypertension hyperlipidemia impaired fasting glucose patient's insurance does not cover Wegovy or Saxenda or Contrave recommend reduce overall caloric intake increase exercise intake increase water intake  Reflux currently controlled with diet no medications at this time  Cervical radiculitis neck pain degenerative disc disease pain managed by gabapentin and meloxicam Gino reviewed urine drug screen obtained will provide refill  Acute pain of left shoulder we will provide a referral to physical therapy obtain x-rays and lidocaine patches for pain as meloxicam is not helping pain  History of 30-year or more smoking years currently is a non-smoker    Follow Up:   Return in about 6 months (around 9/21/2023).    Zonia Barnes, APRN    \"Please note that portions of this note were completed with a voice recognition program.\"    "

## 2023-03-22 DIAGNOSIS — E78.2 MIXED HYPERLIPIDEMIA: Primary | ICD-10-CM

## 2023-03-22 RX ORDER — ATORVASTATIN CALCIUM 20 MG/1
20 TABLET, FILM COATED ORAL
Qty: 90 TABLET | Refills: 1 | Status: SHIPPED | OUTPATIENT
Start: 2023-03-22

## 2023-04-14 ENCOUNTER — TELEPHONE (OUTPATIENT)
Dept: PHYSICAL THERAPY | Facility: CLINIC | Age: 53
End: 2023-04-14

## 2023-08-30 ENCOUNTER — TELEPHONE (OUTPATIENT)
Dept: FAMILY MEDICINE CLINIC | Facility: CLINIC | Age: 53
End: 2023-08-30
Payer: COMMERCIAL

## 2023-08-30 NOTE — TELEPHONE ENCOUNTER
Caller: Green, Joseph Duane    Relationship: Self    Best call back number: [phone]    What orders are you requesting (i.e. lab or imaging): BLOOD WORK FOR UPCOMING REFILL APPOINTMENT     In what timeframe would the patient need to come in: BEFORE 9.6.23     Where will you receive your lab/imaging services: IN OFFICE     Additional notes: PATIENT REQUESTING A CALL ONCE COMPLETED. PATIENT STATES A VOICEMAIL CAN BE LEFT.

## 2023-09-05 ENCOUNTER — LAB (OUTPATIENT)
Dept: LAB | Facility: HOSPITAL | Age: 53
End: 2023-09-05
Payer: COMMERCIAL

## 2023-09-05 DIAGNOSIS — E78.2 MIXED HYPERLIPIDEMIA: ICD-10-CM

## 2023-09-05 LAB
ALBUMIN SERPL-MCNC: 4.5 G/DL (ref 3.5–5.2)
ALBUMIN/GLOB SERPL: 1.5 G/DL
ALP SERPL-CCNC: 70 U/L (ref 39–117)
ALT SERPL W P-5'-P-CCNC: 42 U/L (ref 1–41)
ANION GAP SERPL CALCULATED.3IONS-SCNC: 12 MMOL/L (ref 5–15)
AST SERPL-CCNC: 37 U/L (ref 1–40)
BILIRUB SERPL-MCNC: 0.4 MG/DL (ref 0–1.2)
BUN SERPL-MCNC: 10 MG/DL (ref 6–20)
BUN/CREAT SERPL: 15.2 (ref 7–25)
CALCIUM SPEC-SCNC: 9.4 MG/DL (ref 8.6–10.5)
CHLORIDE SERPL-SCNC: 103 MMOL/L (ref 98–107)
CHOLEST SERPL-MCNC: 246 MG/DL (ref 0–200)
CO2 SERPL-SCNC: 23 MMOL/L (ref 22–29)
CREAT SERPL-MCNC: 0.66 MG/DL (ref 0.76–1.27)
EGFRCR SERPLBLD CKD-EPI 2021: 112.2 ML/MIN/1.73
GLOBULIN UR ELPH-MCNC: 3 GM/DL
GLUCOSE SERPL-MCNC: 95 MG/DL (ref 65–99)
HDLC SERPL-MCNC: 39 MG/DL (ref 40–60)
LDLC SERPL CALC-MCNC: 164 MG/DL (ref 0–100)
LDLC/HDLC SERPL: 4.13 {RATIO}
POTASSIUM SERPL-SCNC: 4.3 MMOL/L (ref 3.5–5.2)
PROT SERPL-MCNC: 7.5 G/DL (ref 6–8.5)
SODIUM SERPL-SCNC: 138 MMOL/L (ref 136–145)
TRIGL SERPL-MCNC: 230 MG/DL (ref 0–150)
VLDLC SERPL-MCNC: 43 MG/DL (ref 5–40)

## 2023-09-05 PROCEDURE — 80061 LIPID PANEL: CPT

## 2023-09-05 PROCEDURE — 84443 ASSAY THYROID STIM HORMONE: CPT | Performed by: NURSE PRACTITIONER

## 2023-09-05 PROCEDURE — 80053 COMPREHEN METABOLIC PANEL: CPT

## 2023-09-06 ENCOUNTER — OFFICE VISIT (OUTPATIENT)
Dept: FAMILY MEDICINE CLINIC | Facility: CLINIC | Age: 53
End: 2023-09-06
Payer: COMMERCIAL

## 2023-09-06 VITALS
SYSTOLIC BLOOD PRESSURE: 126 MMHG | HEART RATE: 78 BPM | HEIGHT: 72 IN | BODY MASS INDEX: 42.66 KG/M2 | DIASTOLIC BLOOD PRESSURE: 80 MMHG | WEIGHT: 315 LBS | TEMPERATURE: 98.8 F | OXYGEN SATURATION: 94 %

## 2023-09-06 DIAGNOSIS — K21.9 GASTROESOPHAGEAL REFLUX DISEASE WITHOUT ESOPHAGITIS: ICD-10-CM

## 2023-09-06 DIAGNOSIS — E66.01 CLASS 3 SEVERE OBESITY DUE TO EXCESS CALORIES WITH SERIOUS COMORBIDITY AND BODY MASS INDEX (BMI) OF 40.0 TO 44.9 IN ADULT: ICD-10-CM

## 2023-09-06 DIAGNOSIS — Z12.11 SCREENING FOR COLON CANCER: ICD-10-CM

## 2023-09-06 DIAGNOSIS — F17.200 TOBACCO USE DISORDER: ICD-10-CM

## 2023-09-06 DIAGNOSIS — I10 PRIMARY HYPERTENSION: Primary | ICD-10-CM

## 2023-09-06 DIAGNOSIS — M54.12 CERVICAL RADICULITIS: ICD-10-CM

## 2023-09-06 DIAGNOSIS — Z13.29 SCREENING FOR THYROID DISORDER: ICD-10-CM

## 2023-09-06 DIAGNOSIS — Z79.899 MEDICATION MANAGEMENT: ICD-10-CM

## 2023-09-06 DIAGNOSIS — M25.561 CHRONIC PAIN OF RIGHT KNEE: ICD-10-CM

## 2023-09-06 DIAGNOSIS — M54.2 NECK PAIN: ICD-10-CM

## 2023-09-06 DIAGNOSIS — Z87.891 HISTORY OF SMOKING 30 OR MORE PACK YEARS: ICD-10-CM

## 2023-09-06 DIAGNOSIS — J44.9 CHRONIC OBSTRUCTIVE PULMONARY DISEASE, UNSPECIFIED COPD TYPE: ICD-10-CM

## 2023-09-06 DIAGNOSIS — E78.2 MIXED HYPERLIPIDEMIA: ICD-10-CM

## 2023-09-06 DIAGNOSIS — E78.2 MIXED HYPERLIPIDEMIA: Primary | ICD-10-CM

## 2023-09-06 DIAGNOSIS — G89.29 CHRONIC PAIN OF RIGHT KNEE: ICD-10-CM

## 2023-09-06 DIAGNOSIS — R73.01 IMPAIRED FASTING GLUCOSE: ICD-10-CM

## 2023-09-06 LAB
AMPHET+METHAMPHET UR QL: NEGATIVE
AMPHETAMINE INTERNAL CONTROL: NORMAL
AMPHETAMINES UR QL: NEGATIVE
BACTERIA UR QL AUTO: NORMAL /HPF
BARBITURATE INTERNAL CONTROL: NORMAL
BARBITURATES UR QL SCN: NEGATIVE
BENZODIAZ UR QL SCN: NEGATIVE
BENZODIAZEPINE INTERNAL CONTROL: NORMAL
BILIRUB UR QL STRIP: NEGATIVE
BUPRENORPHINE INTERNAL CONTROL: NORMAL
BUPRENORPHINE SERPL-MCNC: NEGATIVE NG/ML
CANNABINOIDS SERPL QL: NEGATIVE
CLARITY UR: CLEAR
COCAINE INTERNAL CONTROL: NORMAL
COCAINE UR QL: NEGATIVE
COLOR UR: YELLOW
EXPIRATION DATE: NORMAL
GLUCOSE UR STRIP-MCNC: NEGATIVE MG/DL
HGB UR QL STRIP.AUTO: NEGATIVE
HYALINE CASTS UR QL AUTO: NORMAL /LPF
KETONES UR QL STRIP: NEGATIVE
LEUKOCYTE ESTERASE UR QL STRIP.AUTO: NEGATIVE
Lab: NORMAL
MDMA (ECSTASY) INTERNAL CONTROL: NORMAL
MDMA UR QL SCN: NEGATIVE
METHADONE INTERNAL CONTROL: NORMAL
METHADONE UR QL SCN: NEGATIVE
METHAMPHETAMINE INTERNAL CONTROL: NORMAL
NITRITE UR QL STRIP: NEGATIVE
OPIATES INTERNAL CONTROL: NORMAL
OPIATES UR QL: NEGATIVE
OXYCODONE INTERNAL CONTROL: NORMAL
OXYCODONE UR QL SCN: NEGATIVE
PCP UR QL SCN: NEGATIVE
PH UR STRIP.AUTO: 6 [PH] (ref 5–8)
PHENCYCLIDINE INTERNAL CONTROL: NORMAL
PROT UR QL STRIP: ABNORMAL
RBC # UR STRIP: NORMAL /HPF
REF LAB TEST METHOD: NORMAL
SP GR UR STRIP: 1.01 (ref 1–1.03)
SQUAMOUS #/AREA URNS HPF: NORMAL /HPF
THC INTERNAL CONTROL: NORMAL
TSH SERPL DL<=0.05 MIU/L-ACNC: 4.06 UIU/ML (ref 0.27–4.2)
UROBILINOGEN UR QL STRIP: ABNORMAL
WBC # UR STRIP: NORMAL /HPF

## 2023-09-06 PROCEDURE — 81001 URINALYSIS AUTO W/SCOPE: CPT | Performed by: NURSE PRACTITIONER

## 2023-09-06 RX ORDER — MELOXICAM 15 MG/1
15 TABLET ORAL DAILY
Qty: 90 TABLET | Refills: 1 | Status: SHIPPED | OUTPATIENT
Start: 2023-09-06

## 2023-09-06 RX ORDER — GABAPENTIN 300 MG/1
300 CAPSULE ORAL 3 TIMES DAILY
Qty: 270 CAPSULE | Refills: 1 | Status: SHIPPED | OUTPATIENT
Start: 2023-09-06

## 2023-09-06 RX ORDER — ATORVASTATIN CALCIUM 20 MG/1
20 TABLET, FILM COATED ORAL
Qty: 90 TABLET | Refills: 1 | Status: SHIPPED | OUTPATIENT
Start: 2023-09-06 | End: 2023-09-06 | Stop reason: SDUPTHER

## 2023-09-06 RX ORDER — ATORVASTATIN CALCIUM 20 MG/1
TABLET, FILM COATED ORAL
COMMUNITY
Start: 2023-08-17 | End: 2023-09-06

## 2023-09-06 RX ORDER — ATORVASTATIN CALCIUM 40 MG/1
40 TABLET, FILM COATED ORAL
Qty: 90 TABLET | Refills: 1 | Status: SHIPPED | OUTPATIENT
Start: 2023-09-06

## 2023-09-06 NOTE — PROGRESS NOTES
Follow Up Office Visit      Patient Name: Joseph Duane Green  : 1970   MRN: [id number]     Chief Complaint:    Chief Complaint   Patient presents with    Allergic Rhinitis    Hyperlipidemia    Hypertension    impaired fasting glucose    Heartburn    Anxiety    Depression    COPD       History of Present Illness: Joseph Duane Green is a 53 y.o. male who is here today to follow up for HTN, hyperlipidemia, IFG, GERD, anxiety, depression, COPD, allergic rhinitis.  Review lab results    Labs-2023  Psa-3/2023  Quit smoking in , smoking for 30 years 2 ppd     C/o He has been having bilateral knee pain on and off for the last few weeks.   He had a normal right knee xray 23. Denies numbness and tingling.  States right knee pain is worse than left states meloxicam does help some  Requests lift for right shoe would like rx to help alleviate his knee pain      Requests a refill of gabapentin for chronic neck pain     He would like to discuss weight loss options states wegovy is not covered       Subjective      Review of Systems:   Review of Systems   Constitutional:  Negative for fever.   HENT:  Negative for ear pain and sore throat.    Respiratory:  Negative for cough.    Cardiovascular:  Negative for chest pain.   Gastrointestinal:  Negative for abdominal pain, diarrhea, nausea and vomiting.   Genitourinary:  Negative for dysuria.   Musculoskeletal:  Positive for arthralgias.      Past Medical History:   Past Medical History:   Diagnosis Date    Allergic     Anxiety     Depression     GERD (gastroesophageal reflux disease)     Hypertension     Obesity        Past Surgical History:   Past Surgical History:   Procedure Laterality Date    FRACTURE SURGERY Right     leg       Family History:   Family History   Problem Relation Age of Onset    Heart disease Mother     Hypertension Mother     Cancer Maternal Grandfather     Heart disease Paternal Grandmother        Social History:   Social History  "    Socioeconomic History    Marital status: Single   Tobacco Use    Smoking status: Former     Types: Cigarettes    Smokeless tobacco: Never   Vaping Use    Vaping Use: Never used   Substance and Sexual Activity    Alcohol use: Not Currently    Drug use: Not Currently       Medications:     Current Outpatient Medications:     fluticasone (FLONASE) 50 MCG/ACT nasal spray, 2 sprays into the nostril(s) as directed by provider Daily., Disp: 18.2 mL, Rfl: 0    gabapentin (NEURONTIN) 300 MG capsule, Take 1 capsule by mouth 3 (Three) Times a Day., Disp: 270 capsule, Rfl: 1    lidocaine (LIDODERM) 5 %, Place 3 patches on the skin as directed by provider Daily. Remove & Discard patch within 12 hours or as directed by MD, Disp: 90 each, Rfl: 5    meloxicam (MOBIC) 15 MG tablet, Take 1 tablet by mouth Daily., Disp: 90 tablet, Rfl: 1    atorvastatin (LIPITOR) 20 MG tablet, Take 1 tablet by mouth every night at bedtime., Disp: 90 tablet, Rfl: 1    Allergies:   Allergies   Allergen Reactions    Adhesive Tape Itching             Objective     Physical Exam:  Vital Signs:   Vitals:    09/06/23 1135   BP: 126/80   Pulse: 78   Temp: 98.8 °F (37.1 °C)   SpO2: 94%   Weight: (!) 155 kg (341 lb)   Height: 182.9 cm (72\")     Body mass index is 46.25 kg/m².   Class 3 Severe Obesity (BMI >=40). Obesity-related health conditions include the following: hypertension and dyslipidemias. Obesity is worsening. BMI is is above average; BMI management plan is completed. We discussed portion control and increasing exercise.       Physical Exam  HENT:      Right Ear: Tympanic membrane normal.      Left Ear: Tympanic membrane normal.      Nose: Nose normal.      Mouth/Throat:      Mouth: Mucous membranes are moist.   Eyes:      Conjunctiva/sclera: Conjunctivae normal.   Neck:      Vascular: No carotid bruit.   Cardiovascular:      Rate and Rhythm: Normal rate and regular rhythm.      Heart sounds: Normal heart sounds. No murmur heard.  Pulmonary:      " Effort: Pulmonary effort is normal.      Breath sounds: Normal breath sounds.   Abdominal:      General: Bowel sounds are normal.      Palpations: Abdomen is soft.   Musculoskeletal:      Right lower leg: No edema.      Left lower leg: No edema.      Comments: Right knee Mild swelling, no erythema, medial knee tender to palpation   Skin:     General: Skin is warm and dry.   Neurological:      Mental Status: He is alert.   Psychiatric:         Mood and Affect: Mood normal.         Behavior: Behavior normal.         Assessment / Plan      Assessment/Plan:   Diagnoses and all orders for this visit:    1. Primary hypertension (Primary)    2. Mixed hyperlipidemia  -     Lipid Panel; Future    3. Impaired fasting glucose  -     Hemoglobin A1c; Future  -     Comprehensive Metabolic Panel; Future  -     Urinalysis With Culture If Indicated -    4. Class 3 severe obesity due to excess calories with serious comorbidity and body mass index (BMI) of 40.0 to 44.9 in adult    5. Gastroesophageal reflux disease without esophagitis    6. Neck pain  -     gabapentin (NEURONTIN) 300 MG capsule; Take 1 capsule by mouth 3 (Three) Times a Day.  Dispense: 270 capsule; Refill: 1  -     meloxicam (MOBIC) 15 MG tablet; Take 1 tablet by mouth Daily.  Dispense: 90 tablet; Refill: 1    7. Cervical radiculitis  -     gabapentin (NEURONTIN) 300 MG capsule; Take 1 capsule by mouth 3 (Three) Times a Day.  Dispense: 270 capsule; Refill: 1    8. Chronic obstructive pulmonary disease, unspecified COPD type    9. Tobacco use disorder  -     CT Chest Low Dose Wo; Future    10. Medication management  -     POC Urine Drug Screen [facility]-C[facility]    11. History of smoking 30 or more pack years  -     CT Chest Low Dose Wo; Future    12. Screening for thyroid disorder  -     TSH    13. Screening for colon cancer  -     Ambulatory Referral to General Surgery    14. Chronic pain of right knee  -     MRI Knee Right Without Contrast; Future    Other orders  -     " atorvastatin (LIPITOR) 20 MG tablet; Take 1 tablet by mouth every night at bedtime.  Dispense: 90 tablet; Refill: 1       Hypertension currently controlled on medication at this time  Hyperlipidemia LDL above goal we will increase Lipitor to 40 mg nightly repeating CMP and lipid panel in 30 days to monitor  Impaired fasting glucose recommend decrease added carbs sugars exercise 30 minutes daily and weight loss  Class III obesity with serious comorbidity of hypertension hyperlipidemia impaired fasting glucose Reca reduce overall caloric intake increase water intake exercise 30 minutes daily patient's insurance does not cover Saxenda or Wegovy or Contrave declines referral to bariatric surgery  Reflux controlled without medication at this time  Neck pain cervical radiculitis we will provide a refill of gabapentin 300 mg 3 times daily GAYE reviewed urine drug screen will be obtained in office  COPD no wheezing or shortness of breath  History of greater than 30 smoking pack years will obtain CT low-dose of the chest  Chronic right knee pain with negative x-ray we will obtain MRI of the knee provide refill of meloxicam discussed joint injection patient declined at this time May schedule back  Screening for colonoscopy we will provide a referral to general surgery denies blood in stool recent change in bowel habits        Follow Up:   Return in about 6 months (around 3/6/2024).    Zonia Barnes, SAV    \"Please note that portions of this note were completed with a voice recognition program.\"    "

## 2023-09-21 ENCOUNTER — PREP FOR SURGERY (OUTPATIENT)
Dept: OTHER | Facility: HOSPITAL | Age: 53
End: 2023-09-21
Payer: COMMERCIAL

## 2023-09-21 ENCOUNTER — OFFICE VISIT (OUTPATIENT)
Dept: SURGERY | Facility: CLINIC | Age: 53
End: 2023-09-21
Payer: COMMERCIAL

## 2023-09-21 VITALS — WEIGHT: 315 LBS | BODY MASS INDEX: 42.66 KG/M2 | HEIGHT: 72 IN | HEART RATE: 88 BPM

## 2023-09-21 DIAGNOSIS — Z12.11 SCREENING FOR MALIGNANT NEOPLASM OF COLON: Primary | ICD-10-CM

## 2023-09-21 RX ORDER — SODIUM CHLORIDE 0.9 % (FLUSH) 0.9 %
10 SYRINGE (ML) INJECTION AS NEEDED
OUTPATIENT
Start: 2023-09-21

## 2023-09-21 RX ORDER — SODIUM CHLORIDE 0.9 % (FLUSH) 0.9 %
3 SYRINGE (ML) INJECTION EVERY 12 HOURS SCHEDULED
OUTPATIENT
Start: 2023-09-21

## 2023-09-21 RX ORDER — SODIUM CHLORIDE 9 MG/ML
40 INJECTION, SOLUTION INTRAVENOUS AS NEEDED
OUTPATIENT
Start: 2023-09-21

## 2023-09-21 RX ORDER — PEG-3350, SODIUM SULFATE, SODIUM CHLORIDE, POTASSIUM CHLORIDE, SODIUM ASCORBATE AND ASCORBIC ACID 7.5-2.691G
1000 KIT ORAL ONCE
Qty: 1000 ML | Refills: 0 | Status: SHIPPED | OUTPATIENT
Start: 2023-09-21 | End: 2023-09-21

## 2023-09-21 NOTE — PROGRESS NOTES
Chief Complaint: Colonoscopy (consult)    Subjective      Colonoscopy consultation       History of Present Illness  Joseph Duane Green is a 53 y.o. male presents to [facility] for colonoscopy consultation.    Patient presents today on referral from ewelina Wu for colonoscopy consultation.  Patient denies any abdominal pain, change in bowel habit, or rectal bleeding.  Denies any family history of colorectal cancer.  No previous colonoscopy.    Patient denies ALLIE.  Denies any cardiac issues.  Denies any GLP-1 receptors.  Objective     Past Medical History:   Diagnosis Date    Allergic     Anxiety     Depression     GERD (gastroesophageal reflux disease)     Hypertension     Obesity        Past Surgical History:   Procedure Laterality Date    FRACTURE SURGERY Right     leg       Outpatient Medications Marked as Taking for the 9/21/23 encounter (Office Visit) with Nick April, APRN   Medication Sig Dispense Refill    atorvastatin (LIPITOR) 40 MG tablet Take 1 tablet by mouth every night at bedtime. 90 tablet 1    fluticasone (FLONASE) 50 MCG/ACT nasal spray 2 sprays into the nostril(s) as directed by provider Daily. 18.2 mL 0    gabapentin (NEURONTIN) 300 MG capsule Take 1 capsule by mouth 3 (Three) Times a Day. 270 capsule 1    lidocaine (LIDODERM) 5 % Place 3 patches on the skin as directed by provider Daily. Remove & Discard patch within 12 hours or as directed by MD 90 each 5    meloxicam (MOBIC) 15 MG tablet Take 1 tablet by mouth Daily. 90 tablet 1       Allergies   Allergen Reactions    Adhesive Tape Itching        Family History   Problem Relation Age of Onset    Heart disease Mother     Hypertension Mother     Cancer Maternal Grandfather     Heart disease Paternal Grandmother        Social History     Socioeconomic History    Marital status: Single   Tobacco Use    Smoking status: Former     Types: Cigarettes    Smokeless tobacco: Never   Vaping Use    Vaping  "Use: Never used   Substance and Sexual Activity    Alcohol use: Not Currently    Drug use: Not Currently    Sexual activity: Defer       Review of Systems   Constitutional:  Negative for chills and fever.   Gastrointestinal:  Negative for abdominal distention, abdominal pain, anal bleeding, blood in stool, constipation, diarrhea and rectal pain.      Vital Signs:   Pulse 88   Ht 182.9 cm (72\")   Wt (!) 155 kg (341 lb)   BMI 46.25 kg/m²      Physical Exam  Vitals and nursing note reviewed.   Constitutional:       General: He is not in acute distress.     Appearance: Normal appearance.   HENT:      Head: Normocephalic.   Cardiovascular:      Rate and Rhythm: Normal rate.   Pulmonary:      Breath sounds: Normal breath sounds.   Abdominal:      Palpations: Abdomen is soft.      Tenderness: There is no guarding.   Musculoskeletal:         General: No deformity. Normal range of motion.   Skin:     General: Skin is warm and dry.   Neurological:      General: No focal deficit present.      Mental Status: He is alert and oriented to person, place, and time.   Psychiatric:         Mood and Affect: Mood normal.         Thought Content: Thought content normal.        Result Review :          []  Laboratory  []  Radiology  []  Pathology  []  Microbiology  []  EKG/Telemetry   []  Cardiology/Vascular   []  Old records  I spent 15 minutes caring for Mesfin on this date of service. This time includes time spent by me in the following activities: reviewing tests, obtaining and/or reviewing a separately obtained history, performing a medically appropriate examination and/or evaluation, ordering medications, tests, or procedures, and documenting information in the medical record.     Assessment and Plan    Diagnoses and all orders for this visit:    1. Screening for malignant neoplasm of colon (Primary)    Other orders  -     PEG-KCl-NaCl-NaSulf-Na Asc-C (MOVIPREP) 100 g reconstituted solution powder; Take 1,000 mL by mouth 1 (One) " Time for 1 dose.  Dispense: 1000 mL; Refill: 0        Follow Up   Return for Schedule colonoscopy with Dr. Villegas on 2/22/2024 [facility].    Hospital arrival time: 0700.    Possible risks/complications, benefits, and alternatives to surgical or invasive procedures have been explained to patient and/or legal guardian.    Patient has been evaluated and can tolerate anesthesia and/or sedation. Risks, benefits, and alternatives to anesthesia and sedation have been explained to the patient and/or legal guardian. Patient verbalizes understanding and is willing to proceed with the above plan.     Patient was given instructions and counseling regarding his condition or for health maintenance advice. Please see specific information pulled into the AVS if appropriate.

## 2023-09-27 ENCOUNTER — PROCEDURE VISIT (OUTPATIENT)
Dept: FAMILY MEDICINE CLINIC | Facility: CLINIC | Age: 53
End: 2023-09-27
Payer: COMMERCIAL

## 2023-09-27 VITALS
DIASTOLIC BLOOD PRESSURE: 76 MMHG | HEART RATE: 90 BPM | WEIGHT: 315 LBS | SYSTOLIC BLOOD PRESSURE: 125 MMHG | HEIGHT: 72 IN | BODY MASS INDEX: 42.66 KG/M2 | OXYGEN SATURATION: 95 % | TEMPERATURE: 98.5 F

## 2023-09-27 DIAGNOSIS — G89.29 CHRONIC PAIN OF RIGHT KNEE: Primary | ICD-10-CM

## 2023-09-27 DIAGNOSIS — M25.561 CHRONIC PAIN OF RIGHT KNEE: Primary | ICD-10-CM

## 2023-09-27 DIAGNOSIS — M25.661 DECREASED RANGE OF MOTION OF RIGHT KNEE: ICD-10-CM

## 2023-09-27 RX ORDER — POLYETHYLENE GLYCOL 3350, SODIUM SULFATE, SODIUM CHLORIDE, POTASSIUM CHLORIDE, SODIUM ASCORBATE, AND ASCORBIC ACID 7.5-2.691G
KIT ORAL
COMMUNITY
Start: 2023-09-21

## 2023-09-27 RX ORDER — METHYLPREDNISOLONE ACETATE 40 MG/ML
20 INJECTION, SUSPENSION INTRA-ARTICULAR; INTRALESIONAL; INTRAMUSCULAR; SOFT TISSUE ONCE
Status: COMPLETED | OUTPATIENT
Start: 2023-09-27 | End: 2023-09-27

## 2023-09-27 RX ADMIN — METHYLPREDNISOLONE ACETATE 20 MG: 40 INJECTION, SUSPENSION INTRA-ARTICULAR; INTRALESIONAL; INTRAMUSCULAR; SOFT TISSUE at 15:49

## 2023-09-27 NOTE — PROGRESS NOTES
"     ACUTE VISIT     Patient Name: Joseph Duane Green  : 1970   MRN: [id number]     Chief Complaint:    Chief Complaint   Patient presents with    Knee Pain       History of Present Illness: Joseph Duane Green is a 53 y.o. male who is here today for right knee pain.        He would like a right knee joint injection in office  He had a normal knee xray in 2023.    Pt c/o hard to bear weight on knee at this time  First steps are \"pretty rough\" all steps are getting rough at this   Gabapentin, nsaids not helping         Subjective      Review of Systems:   Review of Systems   Constitutional:  Negative for fever.   Respiratory:  Negative for cough.    Cardiovascular:  Negative for chest pain.   Musculoskeletal:  Positive for arthralgias and joint swelling.   Neurological:  Negative for numbness.      Past Medical History:   Past Medical History:   Diagnosis Date    Allergic     Anxiety     Depression     GERD (gastroesophageal reflux disease)     Hypertension     Obesity        Past Surgical History:   Past Surgical History:   Procedure Laterality Date    FRACTURE SURGERY Right     leg       Family History:   Family History   Problem Relation Age of Onset    Heart disease Mother     Hypertension Mother     Cancer Maternal Grandfather     Heart disease Paternal Grandmother        Social History:   Social History     Socioeconomic History    Marital status: Single   Tobacco Use    Smoking status: Former     Types: Cigarettes    Smokeless tobacco: Never   Vaping Use    Vaping Use: Never used   Substance and Sexual Activity    Alcohol use: Not Currently    Drug use: Not Currently    Sexual activity: Defer       Medications:     Current Outpatient Medications:     atorvastatin (LIPITOR) 40 MG tablet, Take 1 tablet by mouth every night at bedtime., Disp: 90 tablet, Rfl: 1    fluticasone (FLONASE) 50 MCG/ACT nasal spray, 2 sprays into the nostril(s) as directed by provider Daily., Disp: 18.2 mL, Rfl: 0    " "gabapentin (NEURONTIN) 300 MG capsule, Take 1 capsule by mouth 3 (Three) Times a Day., Disp: 270 capsule, Rfl: 1    lidocaine (LIDODERM) 5 %, Place 3 patches on the skin as directed by provider Daily. Remove & Discard patch within 12 hours or as directed by MD, Disp: 90 each, Rfl: 5    meloxicam (MOBIC) 15 MG tablet, Take 1 tablet by mouth Daily., Disp: 90 tablet, Rfl: 1    PEG-3350/Electrolytes/Ascorbat 100 g reconstituted solution powder, , Disp: , Rfl:   No current facility-administered medications for this visit.    Allergies:   Allergies   Allergen Reactions    Adhesive Tape Itching         Objective     Physical Exam:  Vital Signs:   Vitals:    09/27/23 1501   BP: 125/76   Pulse: 90   Temp: 98.5 °F (36.9 °C)   SpO2: 95%   Weight: (!) 156 kg (343 lb)   Height: 182.9 cm (72\")     Body mass index is 46.52 kg/m².     Physical Exam  Cardiovascular:      Rate and Rhythm: Normal rate and regular rhythm.      Heart sounds: Normal heart sounds.   Pulmonary:      Effort: Pulmonary effort is normal.      Breath sounds: Normal breath sounds.   Musculoskeletal:      Right lower leg: No edema.      Left lower leg: No edema.      Comments: Right knee Mild swelling, no erythema, medial knee tender to palpation        Skin:     General: Skin is warm and dry.   Neurological:      Mental Status: He is alert.       Procedure:   Right Knee Joint Injection    Procedure Information:   Informed consent obtained. Patient was informed of possible adverse effects including but not limited to bleeding, damage to nerve, tendon or artery, increased blood sugar and increased blood pressure. Time out was performed. Patient was placed with knee in flexion at 90 degrees. Site marked inferior and lateral to patella. Betadine was swabbed at injection site per typical technique. 27 gauge, 1.5 inch needle injected into bursa, aspiration was performed and then Depo-Medrol 40 mg and 2 mL 1% lidocaine without epinephrine was slowly injected into " joint space, fluid was free flowing. Needle was removed, Betadine wiped off and Band-Aid placed to injection site.     The patient was instructed to call our office if they had any questions or concerns.         Assessment / Plan      Assessment/Plan:   Diagnoses and all orders for this visit:    1. Chronic pain of right knee (Primary)  -     methylPREDNISolone acetate (DEPO-medrol) injection 20 mg    2. Decreased range of motion of right knee       Chronic right knee pain decreased range of motion the MRI of the right knee has been ordered and scheduled we will provide joint injection in office patient tolerated well aftercare instructions provided        Follow Up:   Return if symptoms worsen or fail to improve.    SAV Shah      Please note that portions of this note were completed with a voice recognition program.

## 2023-10-06 PROBLEM — M21.70 LEG LENGTH DISCREPANCY: Status: ACTIVE | Noted: 2023-10-06

## 2023-10-23 ENCOUNTER — HOSPITAL ENCOUNTER (OUTPATIENT)
Dept: CT IMAGING | Facility: HOSPITAL | Age: 53
Discharge: HOME | End: 2023-10-23
Payer: COMMERCIAL

## 2023-10-23 ENCOUNTER — HOSPITAL ENCOUNTER (OUTPATIENT)
Dept: MRI IMAGING | Facility: HOSPITAL | Age: 53
Discharge: HOME | End: 2023-10-23
Payer: COMMERCIAL

## 2023-10-23 DIAGNOSIS — M25.561 CHRONIC PAIN OF RIGHT KNEE: ICD-10-CM

## 2023-10-23 DIAGNOSIS — F17.200 TOBACCO USE DISORDER: ICD-10-CM

## 2023-10-23 DIAGNOSIS — G89.29 CHRONIC PAIN OF RIGHT KNEE: ICD-10-CM

## 2023-10-23 DIAGNOSIS — Z87.891 HISTORY OF SMOKING 30 OR MORE PACK YEARS: ICD-10-CM

## 2023-10-23 PROCEDURE — 71271 CT THORAX LUNG CANCER SCR C-: CPT

## 2023-10-23 PROCEDURE — 73721 MRI JNT OF LWR EXTRE W/O DYE: CPT

## 2023-10-24 DIAGNOSIS — S83.241A ACUTE MEDIAL MENISCUS TEAR OF RIGHT KNEE, INITIAL ENCOUNTER: Primary | ICD-10-CM

## 2023-10-30 ENCOUNTER — OFFICE VISIT (OUTPATIENT)
Dept: ORTHOPEDIC SURGERY | Facility: CLINIC | Age: 53
End: 2023-10-30
Payer: COMMERCIAL

## 2023-10-30 VITALS — BODY MASS INDEX: 41.75 KG/M2 | HEIGHT: 73 IN | WEIGHT: 315 LBS

## 2023-10-30 DIAGNOSIS — M25.561 RIGHT KNEE PAIN, UNSPECIFIED CHRONICITY: Primary | ICD-10-CM

## 2023-10-30 DIAGNOSIS — S83.241A ACUTE MEDIAL MENISCUS TEAR OF RIGHT KNEE, INITIAL ENCOUNTER: ICD-10-CM

## 2023-10-30 NOTE — PROGRESS NOTES
"Chief Complaint  Follow-up of the Right Knee     Subjective      Joseph Duane Green presents to [facility] for follow up of the right knee.  He had a MRI on 10/23/23.  He is here to review. He has pain around the medial aspect of the right knee.  He wears a lidocaine patch that gives some relief. He has a leg length discrepancy. He has difficulty with prolonged standing and ambulation. He also wears a brace for work. He has an appointment with [facility] for shoe inserts. He also uses anti inflammatories as  needed.      Allergies   Allergen Reactions    Adhesive Tape Itching        Social History     Socioeconomic History    Marital status: Single   Tobacco Use    Smoking status: Former     Types: Cigarettes    Smokeless tobacco: Never   Vaping Use    Vaping Use: Never used   Substance and Sexual Activity    Alcohol use: Not Currently    Drug use: Not Currently    Sexual activity: Defer        I reviewed the patient's chief complaint, history of present illness, review of systems, past medical history, surgical history, family history, social history, medications, and allergy list.     Review of Systems     Constitutional: Denies fevers, chills, weight loss  Cardiovascular: Denies chest pain, shortness of breath  Skin: Denies rashes, acute skin changes  Neurologic: Denies headache, loss of consciousness        Vital Signs:   Ht 185.4 cm (73\")   Wt (!) 159 kg (350 lb 12.8 oz)   BMI 46.28 kg/m²          Physical Exam  General: Alert. No acute distress    Ortho Exam        RIGHT KNEE Flexion 120. Extension 0. Stable to varus/valgus stress. Stable to anterior/posterior drawer. Neurovascularly intact. Positive Adrienne. Negative Lachman. Positive EHL, FHL, HS and TA. Sensation intact to light touch all 5 nerves of the foot. Ambulates with Antalgic gait. Patella is well tracking. Calf supple, non-tender. Positive tenderness to the medial joint line. Negative tenderness to the lateral " joint line. Negative Crepitus. Good strength to hamstrings, quadriceps, dorsiflexors, and plantar flexors.  Knee Extensor Mechanism intact       Procedures        Imaging Results (Most Recent)       None             Result Review :         MRI Knee Right Without Contrast    Result Date: 10/23/2023  Narrative: PROCEDURE: MRI KNEE RIGHT  WO CONTRAST  COMPARISON: [facility], CR, XR KNEE 1 OR 2 VW RIGHT, 8/17/2023, 10:19.  INDICATIONS: ACUTE RIGHT KNEE PAIN EXPERIENCED MEDIALLY AND ANTERIORLY FOR 3 MONTHS. NO RECENT INJURY. HISTORY OF KNEE TRAUMA 40 YEARS AGO.      TECHNIQUE: A complete multi-planar MRI was performed.   FINDINGS:  No evidence of fracture.  No significant joint effusion.  No suspicious marrow signal abnormality.  Trace fluid is present in a Baker's cyst without definite rupture.  Patellar alignment appears within normal limits.  Quadriceps, patellar, and popliteus tendons appear intact.  Iliotibial band appears intact.  The cruciate and collateral ligaments appear intact.  Mild subcutaneous edema surrounds the knee.  There is an oblique horizontal undersurface tear of the medial meniscus posterior horn extending into the posterior body.  There is also a radial free edge tear the posterior horn which appears to be partial thickness.  No definite lateral meniscus defect is identified.  There is suspected small full-thickness cartilage defect at the inferior lateral trochlea where there is a small focus of subchondral edema.  There otherwise appears to be partial thickness cartilage thinning of the patellofemoral articular cartilage.  There is also suspected mild tibiofemoral chondromalacia with probable partial thickness cartilage loss.  No significant full-thickness cartilage defect is seen.      Impression:   1. Horizontal and radial free edge tears of the medial meniscus posterior horn. 2. Mild tricompartmental chondromalacia with suspected small full-thickness cartilage defect at the inferior lateral  denzel.     GET ERIC MD       Electronically Signed and Approved By: GET ERIC MD on 10/23/2023 at 14:21                Assessment and Plan     Diagnoses and all orders for this visit:    1. Right knee pain, unspecified chronicity (Primary)    2. medial meniscus tear of right knee, initial encounter        Discussed the treatment plan with the patient. I reviewed the MRI results with the patient.     Discussed the treatment options with the patient, operative vs non-operative. The patient is a candidate for a right arthroscopy when he is ready.     Call or return if worsening symptoms.    Follow Up     PRN      Patient was given instructions and counseling regarding his condition or for health maintenance advice. Please see specific information pulled into the AVS if appropriate.     Scribed for Shaka Flanagan MD by Laura Frias MA.  10/30/23   16:04 EDT      I have personally performed the services described in this document as scribed by the above individual and it is both accurate and complete. Shaka Flanagan MD 11/01/23

## 2023-11-06 ENCOUNTER — TELEPHONE (OUTPATIENT)
Dept: FAMILY MEDICINE CLINIC | Facility: CLINIC | Age: 53
End: 2023-11-06

## 2023-11-06 NOTE — TELEPHONE ENCOUNTER
Caller: Green, Joseph Duane    Relationship: Self    Best call back number: [phone]     What was the call regarding:       THE PATIENT IS NEEDING A PA FOR     lidocaine (LIDODERM) 5 %        [facility] [address] [phone] [place] [phone]  [phone]

## 2023-11-07 RX ORDER — LIDOCAINE 50 MG/G
3 PATCH TOPICAL EVERY 24 HOURS
Qty: 90 EACH | Refills: 5 | Status: SHIPPED | OUTPATIENT
Start: 2023-11-07

## 2024-02-02 ENCOUNTER — TELEPHONE (OUTPATIENT)
Dept: FAMILY MEDICINE CLINIC | Facility: CLINIC | Age: 54
End: 2024-02-02
Payer: COMMERCIAL

## 2024-02-02 NOTE — TELEPHONE ENCOUNTER
Caller: Green, Joseph Duane    Relationship: Self    Best call back number: [phone]    What is the best time to reach you: ANY     Who are you requesting to speak with (clinical staff, provider,  specific staff member): CLINICAL     Do you know the name of the person who called: PATSY     What was the call regarding: PATIENT IS REQUESTING A CALL BACK TO SEE IF HE WOULD QUALIFY FOR DISABILITY FOR HAVING ONE LEG SHORTER THEN THE OTHER.    PLEASE ADVISE     Is it okay if the provider responds through MyChart: YES

## 2024-02-15 PROBLEM — Z12.11 SCREENING FOR MALIGNANT NEOPLASM OF COLON: Status: ACTIVE | Noted: 2023-09-21

## 2024-02-21 ENCOUNTER — ANESTHESIA EVENT (OUTPATIENT)
Dept: GASTROENTEROLOGY | Facility: HOSPITAL | Age: 54
End: 2024-02-21
Payer: COMMERCIAL

## 2024-02-21 NOTE — ANESTHESIA PREPROCEDURE EVALUATION
Anesthesia Evaluation     Patient summary reviewed   NPO Solid Status: > 8 hours  NPO Liquid Status: > 2 hours           Airway   Mallampati: III  TM distance: >3 FB  Neck ROM: full  Possible difficult intubation and Large neck circumference  Dental - normal exam     Pulmonary - normal exam   (+) a smoker Former, COPD,  Cardiovascular - normal exam  Exercise tolerance: poor (<4 METS)    (+) hypertension well controlled, hyperlipidemia      Neuro/Psych  (+) numbness, psychiatric history Anxiety and Depression  GI/Hepatic/Renal/Endo    (+) morbid obesity, GERD, renal disease- stones    Musculoskeletal     (+) neck pain, radiculopathy  Abdominal  - normal exam    Bowel sounds: normal.   Substance History      OB/GYN          Other   arthritis,     ROS/Med Hx Other: ECHO 2D 2021  CONCLUSIONS:    1.  Normal biventricular systolic function, ejection fraction 60%.    2.  Trace tricuspid regurgitation.                               Anesthesia Plan    ASA 3     general     (Total IV Anesthesia    Patient understands anesthesia not responsible for dental damage.  )  intravenous induction     Anesthetic plan, risks, benefits, and alternatives have been provided, discussed and informed consent has been obtained with: patient.  Pre-procedure education provided  Plan discussed with CRNA.        CODE STATUS:

## 2024-02-22 ENCOUNTER — ANESTHESIA (OUTPATIENT)
Dept: GASTROENTEROLOGY | Facility: HOSPITAL | Age: 54
End: 2024-02-22
Payer: COMMERCIAL

## 2024-02-22 ENCOUNTER — HOSPITAL ENCOUNTER (OUTPATIENT)
Facility: HOSPITAL | Age: 54
Setting detail: HOSPITAL OUTPATIENT SURGERY
Discharge: HOME | End: 2024-02-22
Attending: SURGERY | Admitting: SURGERY
Payer: COMMERCIAL

## 2024-02-22 VITALS
TEMPERATURE: 97 F | RESPIRATION RATE: 16 BRPM | DIASTOLIC BLOOD PRESSURE: 75 MMHG | SYSTOLIC BLOOD PRESSURE: 133 MMHG | OXYGEN SATURATION: 96 % | WEIGHT: 315 LBS | HEART RATE: 82 BPM | BODY MASS INDEX: 45.43 KG/M2

## 2024-02-22 DIAGNOSIS — Z12.11 SCREENING FOR MALIGNANT NEOPLASM OF COLON: ICD-10-CM

## 2024-02-22 PROCEDURE — 25810000003 LACTATED RINGERS PER 1000 ML

## 2024-02-22 PROCEDURE — 25010000002 PROPOFOL 10 MG/ML EMULSION: Performed by: NURSE ANESTHETIST, CERTIFIED REGISTERED

## 2024-02-22 RX ORDER — PROPOFOL 10 MG/ML
VIAL (ML) INTRAVENOUS AS NEEDED
Status: DISCONTINUED | OUTPATIENT
Start: 2024-02-22 | End: 2024-02-22 | Stop reason: SURG

## 2024-02-22 RX ORDER — SODIUM CHLORIDE 9 MG/ML
40 INJECTION, SOLUTION INTRAVENOUS AS NEEDED
Status: DISCONTINUED | OUTPATIENT
Start: 2024-02-22 | End: 2024-02-22 | Stop reason: HOSPADM

## 2024-02-22 RX ORDER — SODIUM CHLORIDE, SODIUM LACTATE, POTASSIUM CHLORIDE, CALCIUM CHLORIDE 600; 310; 30; 20 MG/100ML; MG/100ML; MG/100ML; MG/100ML
30 INJECTION, SOLUTION INTRAVENOUS CONTINUOUS
Status: DISCONTINUED | OUTPATIENT
Start: 2024-02-22 | End: 2024-02-22 | Stop reason: HOSPADM

## 2024-02-22 RX ORDER — SODIUM CHLORIDE 0.9 % (FLUSH) 0.9 %
10 SYRINGE (ML) INJECTION AS NEEDED
Status: DISCONTINUED | OUTPATIENT
Start: 2024-02-22 | End: 2024-02-22 | Stop reason: HOSPADM

## 2024-02-22 RX ORDER — SODIUM CHLORIDE 0.9 % (FLUSH) 0.9 %
3 SYRINGE (ML) INJECTION EVERY 12 HOURS SCHEDULED
Status: DISCONTINUED | OUTPATIENT
Start: 2024-02-22 | End: 2024-02-22 | Stop reason: HOSPADM

## 2024-02-22 RX ORDER — LIDOCAINE HYDROCHLORIDE 20 MG/ML
INJECTION, SOLUTION EPIDURAL; INFILTRATION; INTRACAUDAL; PERINEURAL AS NEEDED
Status: DISCONTINUED | OUTPATIENT
Start: 2024-02-22 | End: 2024-02-22 | Stop reason: SURG

## 2024-02-22 RX ADMIN — LIDOCAINE HYDROCHLORIDE 50 MG: 20 INJECTION, SOLUTION INTRAVENOUS at 08:19

## 2024-02-22 RX ADMIN — PROPOFOL 200 MCG/KG/MIN: 10 INJECTION, EMULSION INTRAVENOUS at 08:19

## 2024-02-22 RX ADMIN — SODIUM CHLORIDE, POTASSIUM CHLORIDE, SODIUM LACTATE AND CALCIUM CHLORIDE 30 ML/HR: 600; 310; 30; 20 INJECTION, SOLUTION INTRAVENOUS at 07:40

## 2024-02-22 RX ADMIN — PROPOFOL 60 MG: 10 INJECTION, EMULSION INTRAVENOUS at 08:19

## 2024-02-22 NOTE — H&P
General Surgery/Colorectal Surgery Note    Patient Name:  Joseph Duane Green  YOB: 1970  [id number]    Referring Provider: Morales Barnes      Patient Care Team:  Morales Barnes APRN as PCP - General (Nurse Practitioner)    Subjective .     History of present illness:    HPI   referral from morales Wu for colonoscopy consultation. Patient denies any abdominal pain, change in bowel habit, or rectal bleeding. Denies any family history of colorectal cancer. No previous colonoscopy.     History:  Past Medical History:   Diagnosis Date    Allergic     Anxiety     Depression     GERD (gastroesophageal reflux disease)     Hypertension     Obesity        Past Surgical History:   Procedure Laterality Date    FRACTURE SURGERY Right     leg       Family History   Problem Relation Age of Onset    Heart disease Mother     Hypertension Mother     Cancer Maternal Grandfather     Heart disease Paternal Grandmother        Social History     Tobacco Use    Smoking status: Former     Types: Cigarettes    Smokeless tobacco: Never   Vaping Use    Vaping Use: Never used   Substance Use Topics    Alcohol use: Not Currently    Drug use: Not Currently       Review of Systems: See HPI    Review of Systems            Medications Prior to Admission   Medication Sig Dispense Refill Last Dose    atorvastatin (LIPITOR) 40 MG tablet Take 1 tablet by mouth every night at bedtime. 90 tablet 1     fluticasone (FLONASE) 50 MCG/ACT nasal spray 2 sprays into the nostril(s) as directed by provider Daily. 18.2 mL 0     gabapentin (NEURONTIN) 300 MG capsule Take 1 capsule by mouth 3 (Three) Times a Day. 270 capsule 1     lidocaine (LIDODERM) 5 % Place 3 patches on the skin as directed by provider Daily. Remove & Discard patch within 12 hours or as directed by MD 90 each 5     meloxicam (MOBIC) 15 MG tablet Take 1 tablet by mouth Daily. 90 tablet 1     PEG-3350/Electrolytes/Ascorbat 100 g reconstituted  solution powder             Home Meds:      Prior to Admission medications    Medication Sig Start Date End Date Taking? Authorizing Provider   atorvastatin (LIPITOR) 40 MG tablet Take 1 tablet by mouth every night at bedtime. 9/6/23   Morales Barnes APRN   fluticasone (FLONASE) 50 MCG/ACT nasal spray 2 sprays into the nostril(s) as directed by provider Daily. 12/15/22   Myke Arndt PA   gabapentin (NEURONTIN) 300 MG capsule Take 1 capsule by mouth 3 (Three) Times a Day. 9/6/23   Morales Banres APRN   lidocaine (LIDODERM) 5 % Place 3 patches on the skin as directed by provider Daily. Remove & Discard patch within 12 hours or as directed by MD 11/7/23   Morales Barnes APRN   meloxicam (MOBIC) 15 MG tablet Take 1 tablet by mouth Daily. 9/6/23   Morales Barnes APRN   PEG-3350/Electrolytes/Ascorbat 100 g reconstituted solution powder  9/21/23   Provider, MD Lindsey            Allergies:  Adhesive tape      Objective     Vital Signs        Physical Exam:     Physical Exam    NAD, A/O x 4, normal circulation, normal respiration      Result Review :     Assessment & Plan     There are no diagnoses linked to this encounter.       Risks including bleeding, perforation, pain, infection, missed polyp(s). Benefits, and alternatives of Colonoscopy discussed with patient.  All questions answered.  Consent verified.         Chris Villegas MD  02/22/24 07:20 EST

## 2024-02-22 NOTE — ANESTHESIA POSTPROCEDURE EVALUATION
Patient: Joseph Duane Green    Procedure Summary       Date: 02/22/24 Room / Location: [facility] 1 / [facility]    Anesthesia Start: 0817 Anesthesia Stop: 0848    Procedure: COLONOSCOPY Diagnosis:       Screening for malignant neoplasm of colon      (Screening for malignant neoplasm of colon [Z12.11])    Surgeons: Chris Villegas MD Provider: Jhoana Man CRNA    Anesthesia Type: general ASA Status: 3            Anesthesia Type: general    Vitals  Vitals Value Taken Time   /68 02/22/24 0901   Temp 36.1 °C (97 °F) 02/22/24 0900   Pulse 67 02/22/24 0905   Resp 16 02/22/24 0900   SpO2 99 % 02/22/24 0905   Vitals shown include unfiled device data.        Post Anesthesia Care and Evaluation    Post-procedure mental status: acceptable.  Pain management: satisfactory to patient    Airway patency: patent  Anesthetic complications: No anesthetic complications    Cardiovascular status: acceptable  Respiratory status: acceptable    Comments: Per chart review

## 2024-03-01 ENCOUNTER — OFFICE VISIT (OUTPATIENT)
Dept: FAMILY MEDICINE CLINIC | Facility: CLINIC | Age: 54
End: 2024-03-01
Payer: COMMERCIAL

## 2024-03-01 VITALS
HEIGHT: 73 IN | TEMPERATURE: 97.2 F | OXYGEN SATURATION: 97 % | BODY MASS INDEX: 41.75 KG/M2 | SYSTOLIC BLOOD PRESSURE: 130 MMHG | WEIGHT: 315 LBS | DIASTOLIC BLOOD PRESSURE: 80 MMHG | HEART RATE: 80 BPM

## 2024-03-01 DIAGNOSIS — M54.2 NECK PAIN: ICD-10-CM

## 2024-03-01 DIAGNOSIS — M50.30 DDD (DEGENERATIVE DISC DISEASE), CERVICAL: ICD-10-CM

## 2024-03-01 DIAGNOSIS — Z79.899 MEDICATION MANAGEMENT: ICD-10-CM

## 2024-03-01 DIAGNOSIS — E66.01 CLASS 3 SEVERE OBESITY DUE TO EXCESS CALORIES WITH SERIOUS COMORBIDITY AND BODY MASS INDEX (BMI) OF 40.0 TO 44.9 IN ADULT: ICD-10-CM

## 2024-03-01 DIAGNOSIS — I10 PRIMARY HYPERTENSION: Primary | ICD-10-CM

## 2024-03-01 DIAGNOSIS — Z12.5 SCREENING PSA (PROSTATE SPECIFIC ANTIGEN): ICD-10-CM

## 2024-03-01 DIAGNOSIS — E78.2 MIXED HYPERLIPIDEMIA: ICD-10-CM

## 2024-03-01 DIAGNOSIS — Z13.29 SCREENING FOR THYROID DISORDER: ICD-10-CM

## 2024-03-01 DIAGNOSIS — Z87.891 HISTORY OF SMOKING 30 OR MORE PACK YEARS: ICD-10-CM

## 2024-03-01 DIAGNOSIS — J44.9 CHRONIC OBSTRUCTIVE PULMONARY DISEASE, UNSPECIFIED COPD TYPE: ICD-10-CM

## 2024-03-01 DIAGNOSIS — M54.12 CERVICAL RADICULITIS: ICD-10-CM

## 2024-03-01 DIAGNOSIS — R73.01 IMPAIRED FASTING GLUCOSE: ICD-10-CM

## 2024-03-01 LAB
ALBUMIN SERPL-MCNC: 4.4 G/DL (ref 3.5–5.2)
ALBUMIN/GLOB SERPL: 1.5 G/DL
ALP SERPL-CCNC: 71 U/L (ref 39–117)
ALT SERPL W P-5'-P-CCNC: 28 U/L (ref 1–41)
AMPHET+METHAMPHET UR QL: NEGATIVE
AMPHETAMINE INTERNAL CONTROL: NORMAL
AMPHETAMINES UR QL: NEGATIVE
ANION GAP SERPL CALCULATED.3IONS-SCNC: 13 MMOL/L (ref 5–15)
AST SERPL-CCNC: 27 U/L (ref 1–40)
BARBITURATE INTERNAL CONTROL: NORMAL
BARBITURATES UR QL SCN: NEGATIVE
BENZODIAZ UR QL SCN: NEGATIVE
BENZODIAZEPINE INTERNAL CONTROL: NORMAL
BILIRUB SERPL-MCNC: 0.6 MG/DL (ref 0–1.2)
BUN SERPL-MCNC: 14 MG/DL (ref 6–20)
BUN/CREAT SERPL: 14.7 (ref 7–25)
BUPRENORPHINE INTERNAL CONTROL: NORMAL
BUPRENORPHINE SERPL-MCNC: NEGATIVE NG/ML
CALCIUM SPEC-SCNC: 9 MG/DL (ref 8.6–10.5)
CANNABINOIDS SERPL QL: NEGATIVE
CHLORIDE SERPL-SCNC: 103 MMOL/L (ref 98–107)
CHOLEST SERPL-MCNC: 168 MG/DL (ref 0–200)
CO2 SERPL-SCNC: 23 MMOL/L (ref 22–29)
COCAINE INTERNAL CONTROL: NORMAL
COCAINE UR QL: NEGATIVE
CREAT SERPL-MCNC: 0.95 MG/DL (ref 0.76–1.27)
EGFRCR SERPLBLD CKD-EPI 2021: 95.7 ML/MIN/1.73
EXPIRATION DATE: NORMAL
GLOBULIN UR ELPH-MCNC: 2.9 GM/DL
GLUCOSE SERPL-MCNC: 87 MG/DL (ref 65–99)
HBA1C MFR BLD: 5.9 % (ref 4.8–5.6)
HDLC SERPL-MCNC: 39 MG/DL (ref 40–60)
LDLC SERPL CALC-MCNC: 101 MG/DL (ref 0–100)
LDLC/HDLC SERPL: 2.49 {RATIO}
Lab: NORMAL
MDMA (ECSTASY) INTERNAL CONTROL: NORMAL
MDMA UR QL SCN: NEGATIVE
METHADONE INTERNAL CONTROL: NORMAL
METHADONE UR QL SCN: NEGATIVE
METHAMPHETAMINE INTERNAL CONTROL: NORMAL
MORPHINE INTERNAL CONTROL: NORMAL
MORPHINE/OPIATES SCREEN, URINE: NEGATIVE
OXYCODONE INTERNAL CONTROL: NORMAL
OXYCODONE UR QL SCN: NEGATIVE
PCP UR QL SCN: NEGATIVE
PHENCYCLIDINE INTERNAL CONTROL: NORMAL
POTASSIUM SERPL-SCNC: 4 MMOL/L (ref 3.5–5.2)
PROT SERPL-MCNC: 7.3 G/DL (ref 6–8.5)
SODIUM SERPL-SCNC: 139 MMOL/L (ref 136–145)
T4 FREE SERPL-MCNC: 0.8 NG/DL (ref 0.93–1.7)
THC INTERNAL CONTROL: NORMAL
TRIGL SERPL-MCNC: 160 MG/DL (ref 0–150)
TSH SERPL DL<=0.05 MIU/L-ACNC: 1.65 UIU/ML (ref 0.27–4.2)
VLDLC SERPL-MCNC: 28 MG/DL (ref 5–40)

## 2024-03-01 PROCEDURE — 84439 ASSAY OF FREE THYROXINE: CPT | Performed by: NURSE PRACTITIONER

## 2024-03-01 PROCEDURE — 80061 LIPID PANEL: CPT | Performed by: NURSE PRACTITIONER

## 2024-03-01 PROCEDURE — 84443 ASSAY THYROID STIM HORMONE: CPT | Performed by: NURSE PRACTITIONER

## 2024-03-01 PROCEDURE — 83036 HEMOGLOBIN GLYCOSYLATED A1C: CPT | Performed by: NURSE PRACTITIONER

## 2024-03-01 PROCEDURE — 80053 COMPREHEN METABOLIC PANEL: CPT | Performed by: NURSE PRACTITIONER

## 2024-03-01 RX ORDER — ATORVASTATIN CALCIUM 40 MG/1
40 TABLET, FILM COATED ORAL
Qty: 90 TABLET | Refills: 1 | Status: SHIPPED | OUTPATIENT
Start: 2024-03-01 | End: 2024-03-04

## 2024-03-01 RX ORDER — LIDOCAINE 50 MG/G
3 PATCH TOPICAL EVERY 24 HOURS
Qty: 90 EACH | Refills: 5 | Status: SHIPPED | OUTPATIENT
Start: 2024-03-01

## 2024-03-01 RX ORDER — GABAPENTIN 300 MG/1
300 CAPSULE ORAL 3 TIMES DAILY
Qty: 270 CAPSULE | Refills: 1 | Status: SHIPPED | OUTPATIENT
Start: 2024-03-01

## 2024-03-01 RX ORDER — SEMAGLUTIDE 0.25 MG/.5ML
0.25 INJECTION, SOLUTION SUBCUTANEOUS WEEKLY
Qty: 2 ML | Refills: 0 | Status: SHIPPED | OUTPATIENT
Start: 2024-03-01

## 2024-03-01 RX ORDER — MELOXICAM 15 MG/1
15 TABLET ORAL DAILY
Qty: 90 TABLET | Refills: 1 | Status: SHIPPED | OUTPATIENT
Start: 2024-03-01

## 2024-03-01 NOTE — PROGRESS NOTES
Follow Up Office Visit      Patient Name: Joseph Duane Green  : 1970   MRN: [id number]     Chief Complaint:    Chief Complaint   Patient presents with    Anxiety    Depression    Hypertension    impaired fasting glucose    Hyperlipidemia    Heartburn    COPD       History of Present Illness: Joseph Duane Green is a 53 y.o. male who is here today to follow up for  HTN, hyperlipidemia, IFG, GERD, anxiety, depression, COPD, allergic rhinitis.  Review lab results     Labs-2023  Psa-3/2023  Ct low dose of chest 10/2023  Quit smoking in , smoking for 30 years 2 ppd   Colonoscopy 2024, repeat in 10 years      C/o weight not going anywhere  States he has signed up for a weight loss competition to help    Quit smoking in , smoking for 30 years 2 ppd           Subjective      Review of Systems:   Review of Systems   Constitutional:  Negative for fever.   HENT:  Negative for ear pain and sore throat.    Respiratory:  Negative for cough.    Cardiovascular:  Negative for chest pain.   Gastrointestinal:  Negative for abdominal pain, constipation, diarrhea, nausea and vomiting.   Genitourinary:  Negative for dysuria.   Musculoskeletal:  Negative for myalgias.        Past Medical History:   Past Medical History:   Diagnosis Date    Allergic     Anxiety     Depression     GERD (gastroesophageal reflux disease)     Hypertension     Obesity        Past Surgical History:   Past Surgical History:   Procedure Laterality Date    COLONOSCOPY N/A 2024    Procedure: COLONOSCOPY;  Surgeon: Chris Villegas MD;  Location: [facility] ENDOSCOPY;  Service: General;  Laterality: N/A;  DIVERTICULOSIS    FRACTURE SURGERY Right     leg       Family History:   Family History   Problem Relation Age of Onset    Heart disease Mother     Hypertension Mother     Cancer Maternal Grandfather     Heart disease Paternal Grandmother        Social History:   Social History     Socioeconomic History    Marital status: Single  "  Tobacco Use    Smoking status: Former     Types: Cigarettes     Passive exposure: Never    Smokeless tobacco: Never   Vaping Use    Vaping Use: Never used   Substance and Sexual Activity    Alcohol use: Not Currently    Drug use: Not Currently    Sexual activity: Defer       Medications:     Current Outpatient Medications:     atorvastatin (LIPITOR) 40 MG tablet, Take 1 tablet by mouth every night at bedtime., Disp: 90 tablet, Rfl: 1    fluticasone (FLONASE) 50 MCG/ACT nasal spray, 2 sprays into the nostril(s) as directed by provider Daily., Disp: 18.2 mL, Rfl: 0    gabapentin (NEURONTIN) 300 MG capsule, Take 1 capsule by mouth 3 (Three) Times a Day., Disp: 270 capsule, Rfl: 1    lidocaine (LIDODERM) 5 %, Place 3 patches on the skin as directed by provider Daily. Remove & Discard patch within 12 hours or as directed by MD, Disp: 90 each, Rfl: 5    meloxicam (MOBIC) 15 MG tablet, Take 1 tablet by mouth Daily., Disp: 90 tablet, Rfl: 1    Semaglutide-Weight Management (Wegovy) 0.25 MG/0.5ML solution auto-injector, Inject 0.5 mL under the skin into the appropriate area as directed 1 (One) Time Per Week., Disp: 2 mL, Rfl: 0    Allergies:   Allergies   Allergen Reactions    Adhesive Tape Itching           PHQ-2 Total Score: 0   PHQ-9 Total Score: 0     Objective     Physical Exam:  Vital Signs:   Vitals:    03/01/24 1415   BP: 130/80   Pulse: 80   Temp: 97.2 °F (36.2 °C)   SpO2: 97%   Weight: (!) 160 kg (352 lb)   Height: 185.4 cm (73\")     Body mass index is 46.44 kg/m².           Physical Exam  Constitutional:       Appearance: He is obese.   HENT:      Right Ear: Tympanic membrane normal.      Left Ear: Tympanic membrane normal.      Nose: Nose normal.      Mouth/Throat:      Mouth: Mucous membranes are moist.   Eyes:      Conjunctiva/sclera: Conjunctivae normal.   Neck:      Vascular: No carotid bruit.   Cardiovascular:      Rate and Rhythm: Normal rate and regular rhythm.      Heart sounds: Normal heart sounds. No " murmur heard.  Pulmonary:      Effort: Pulmonary effort is normal.      Breath sounds: Normal breath sounds.   Abdominal:      General: Bowel sounds are normal.      Palpations: Abdomen is soft.   Musculoskeletal:      Right lower leg: No edema.      Left lower leg: No edema.   Skin:     General: Skin is warm and dry.   Neurological:      Mental Status: He is alert.   Psychiatric:         Mood and Affect: Mood normal.         Behavior: Behavior normal.             Assessment / Plan      Assessment/Plan:   Diagnoses and all orders for this visit:    1. Primary hypertension (Primary)    2. Mixed hyperlipidemia  -     Lipid Panel  -     Cancel: Lipid Panel  -     Cancel: Comprehensive Metabolic Panel    3. Impaired fasting glucose  -     Hemoglobin A1c  -     Comprehensive Metabolic Panel    4. Class 3 severe obesity due to excess calories with serious comorbidity and body mass index (BMI) of 40.0 to 44.9 in adult    5. Cervical radiculitis  -     gabapentin (NEURONTIN) 300 MG capsule; Take 1 capsule by mouth 3 (Three) Times a Day.  Dispense: 270 capsule; Refill: 1    6. Neck pain  -     gabapentin (NEURONTIN) 300 MG capsule; Take 1 capsule by mouth 3 (Three) Times a Day.  Dispense: 270 capsule; Refill: 1  -     meloxicam (MOBIC) 15 MG tablet; Take 1 tablet by mouth Daily.  Dispense: 90 tablet; Refill: 1    7. DDD (degenerative disc disease), cervical  -     meloxicam (MOBIC) 15 MG tablet; Take 1 tablet by mouth Daily.  Dispense: 90 tablet; Refill: 1    8. Chronic obstructive pulmonary disease, unspecified COPD type    9. History of smoking 30 or more pack years    10. Medication management  -     POC 12 Panel Urine Drug Screen    11. Screening PSA (prostate specific antigen)  -     PSA Screen; Future    12. Screening for thyroid disorder  -     TSH  -     T4, Free    Other orders  -     atorvastatin (LIPITOR) 40 MG tablet; Take 1 tablet by mouth every night at bedtime.  Dispense: 90 tablet; Refill: 1  -     lidocaine  "(LIDODERM) 5 %; Place 3 patches on the skin as directed by provider Daily. Remove & Discard patch within 12 hours or as directed by MD  Dispense: 90 each; Refill: 5  -     Semaglutide-Weight Management (Wegovy) 0.25 MG/0.5ML solution auto-injector; Inject 0.5 mL under the skin into the appropriate area as directed 1 (One) Time Per Week.  Dispense: 2 mL; Refill: 0         Hypertension controlled without medication at this time  Hyperlipidemia will obtain lipid panel and CMP to monitor current statin dose Lipitor 40 mg at nighttime  Impaired fasting glucose will obtain hemoglobin A1c to monitor Reca reduce added carbs sugars exercise 30 minutes daily and weight loss  Class III obesity will start Wegovy reduce overall caloric intake increase exercise  Cervical radiculitis degenerative disc disease pain currently controlled with gabapentin Gino reviewed urine drug screen obtained  COPD no wheezing or shortness of breath  History of greater than 30 smoking pack years CT low-dose of the chest up-to-date      Follow Up:   Return in about 6 months (around 9/1/2024).    Zonia Barnes, SAV    \"Please note that portions of this note were completed with a voice recognition program.\"    "

## 2024-03-04 DIAGNOSIS — E78.2 MIXED HYPERLIPIDEMIA: Primary | ICD-10-CM

## 2024-03-04 RX ORDER — ATORVASTATIN CALCIUM 80 MG/1
80 TABLET, FILM COATED ORAL
Qty: 90 TABLET | Refills: 1 | Status: SHIPPED | OUTPATIENT
Start: 2024-03-04

## 2024-04-12 ENCOUNTER — OFFICE VISIT (OUTPATIENT)
Dept: FAMILY MEDICINE CLINIC | Facility: CLINIC | Age: 54
End: 2024-04-12
Payer: COMMERCIAL

## 2024-04-12 ENCOUNTER — LAB (OUTPATIENT)
Dept: LAB | Facility: HOSPITAL | Age: 54
End: 2024-04-12
Payer: COMMERCIAL

## 2024-04-12 VITALS
OXYGEN SATURATION: 96 % | HEIGHT: 73 IN | DIASTOLIC BLOOD PRESSURE: 73 MMHG | BODY MASS INDEX: 41.75 KG/M2 | SYSTOLIC BLOOD PRESSURE: 121 MMHG | TEMPERATURE: 97.7 F | WEIGHT: 315 LBS | HEART RATE: 72 BPM

## 2024-04-12 DIAGNOSIS — E78.2 MIXED HYPERLIPIDEMIA: ICD-10-CM

## 2024-04-12 DIAGNOSIS — M50.30 DDD (DEGENERATIVE DISC DISEASE), CERVICAL: ICD-10-CM

## 2024-04-12 DIAGNOSIS — I10 PRIMARY HYPERTENSION: Primary | ICD-10-CM

## 2024-04-12 DIAGNOSIS — M54.12 CERVICAL RADICULITIS: ICD-10-CM

## 2024-04-12 DIAGNOSIS — E66.01 CLASS 3 SEVERE OBESITY DUE TO EXCESS CALORIES WITH SERIOUS COMORBIDITY AND BODY MASS INDEX (BMI) OF 45.0 TO 49.9 IN ADULT: ICD-10-CM

## 2024-04-12 DIAGNOSIS — Z12.5 SCREENING PSA (PROSTATE SPECIFIC ANTIGEN): ICD-10-CM

## 2024-04-12 DIAGNOSIS — R73.01 IMPAIRED FASTING GLUCOSE: ICD-10-CM

## 2024-04-12 DIAGNOSIS — M54.2 NECK PAIN: ICD-10-CM

## 2024-04-12 DIAGNOSIS — Z79.899 MEDICATION MANAGEMENT: ICD-10-CM

## 2024-04-12 LAB
ALBUMIN SERPL-MCNC: 4.2 G/DL (ref 3.5–5.2)
ALBUMIN/GLOB SERPL: 1.4 G/DL
ALP SERPL-CCNC: 77 U/L (ref 39–117)
ALT SERPL W P-5'-P-CCNC: 28 U/L (ref 1–41)
AMPHET+METHAMPHET UR QL: NEGATIVE
AMPHETAMINE INTERNAL CONTROL: NORMAL
AMPHETAMINES UR QL: NEGATIVE
ANION GAP SERPL CALCULATED.3IONS-SCNC: 13.3 MMOL/L (ref 5–15)
AST SERPL-CCNC: 30 U/L (ref 1–40)
BARBITURATE INTERNAL CONTROL: NORMAL
BARBITURATES UR QL SCN: NEGATIVE
BENZODIAZ UR QL SCN: NEGATIVE
BENZODIAZEPINE INTERNAL CONTROL: NORMAL
BILIRUB SERPL-MCNC: 0.6 MG/DL (ref 0–1.2)
BUN SERPL-MCNC: 20 MG/DL (ref 6–20)
BUN/CREAT SERPL: 27.8 (ref 7–25)
BUPRENORPHINE INTERNAL CONTROL: NORMAL
BUPRENORPHINE SERPL-MCNC: NEGATIVE NG/ML
CALCIUM SPEC-SCNC: 9.1 MG/DL (ref 8.6–10.5)
CANNABINOIDS SERPL QL: NEGATIVE
CHLORIDE SERPL-SCNC: 101 MMOL/L (ref 98–107)
CHOLEST SERPL-MCNC: 174 MG/DL (ref 0–200)
CO2 SERPL-SCNC: 22.7 MMOL/L (ref 22–29)
COCAINE INTERNAL CONTROL: NORMAL
COCAINE UR QL: NEGATIVE
CREAT SERPL-MCNC: 0.72 MG/DL (ref 0.76–1.27)
EGFRCR SERPLBLD CKD-EPI 2021: 109.2 ML/MIN/1.73
EXPIRATION DATE: NORMAL
GLOBULIN UR ELPH-MCNC: 3.1 GM/DL
GLUCOSE SERPL-MCNC: 91 MG/DL (ref 65–99)
HDLC SERPL-MCNC: 36 MG/DL (ref 40–60)
LDLC SERPL CALC-MCNC: 98 MG/DL (ref 0–100)
LDLC/HDLC SERPL: 2.54 {RATIO}
Lab: NORMAL
MDMA (ECSTASY) INTERNAL CONTROL: NORMAL
MDMA UR QL SCN: NEGATIVE
METHADONE INTERNAL CONTROL: NORMAL
METHADONE UR QL SCN: NEGATIVE
METHAMPHETAMINE INTERNAL CONTROL: NORMAL
MORPHINE INTERNAL CONTROL: NORMAL
MORPHINE/OPIATES SCREEN, URINE: NEGATIVE
OXYCODONE INTERNAL CONTROL: NORMAL
OXYCODONE UR QL SCN: NEGATIVE
PCP UR QL SCN: NEGATIVE
PHENCYCLIDINE INTERNAL CONTROL: NORMAL
POTASSIUM SERPL-SCNC: 4.7 MMOL/L (ref 3.5–5.2)
PROT SERPL-MCNC: 7.3 G/DL (ref 6–8.5)
PSA SERPL-MCNC: 0.32 NG/ML (ref 0–4)
SODIUM SERPL-SCNC: 137 MMOL/L (ref 136–145)
THC INTERNAL CONTROL: NORMAL
TRIGL SERPL-MCNC: 232 MG/DL (ref 0–150)
VLDLC SERPL-MCNC: 40 MG/DL (ref 5–40)

## 2024-04-12 PROCEDURE — 36415 COLL VENOUS BLD VENIPUNCTURE: CPT

## 2024-04-12 PROCEDURE — 80053 COMPREHEN METABOLIC PANEL: CPT

## 2024-04-12 PROCEDURE — 83525 ASSAY OF INSULIN: CPT

## 2024-04-12 PROCEDURE — 80061 LIPID PANEL: CPT

## 2024-04-12 PROCEDURE — G0103 PSA SCREENING: HCPCS

## 2024-04-12 RX ORDER — GABAPENTIN 300 MG/1
300 CAPSULE ORAL 3 TIMES DAILY
Qty: 270 CAPSULE | Refills: 1 | Status: SHIPPED | OUTPATIENT
Start: 2024-04-12

## 2024-04-12 RX ORDER — MELOXICAM 15 MG/1
15 TABLET ORAL DAILY
Qty: 90 TABLET | Refills: 1 | Status: SHIPPED | OUTPATIENT
Start: 2024-04-12

## 2024-04-12 RX ORDER — ATORVASTATIN CALCIUM 80 MG/1
80 TABLET, FILM COATED ORAL
Qty: 90 TABLET | Refills: 1 | Status: SHIPPED | OUTPATIENT
Start: 2024-04-12

## 2024-04-12 RX ORDER — SEMAGLUTIDE 0.25 MG/.5ML
0.25 INJECTION, SOLUTION SUBCUTANEOUS WEEKLY
Qty: 2 ML | Refills: 0 | Status: SHIPPED | OUTPATIENT
Start: 2024-04-12

## 2024-04-12 NOTE — PROGRESS NOTES
Follow Up Office Visit      Patient Name: Joseph Duane Green  : 1970   MRN: [id number]     Chief Complaint:    Chief Complaint   Patient presents with    lab results    Anxiety    Depression    Hypertension    Hyperlipidemia    Heartburn    Obesity    impaired fasting glucose       History of Present Illness: Joseph Duane Green is a 53 y.o. male who is here today to follow up for HTN, hyperlipidemia, IFG, GERD, anxiety, depression, COPD, allergic rhinitis.  Review lab results     Labs-3/2024  Psa-3/2024  Ct low dose of chest 10/2023  Quit smoking in , smoking for 30 years 2 ppd   Colonoscopy 2024, repeat in 10 years    Follow up increase Lipitor to 80 mg tolerating well     C/o He would like to discuss Wegovy. [facility] in [place] is unable to get it anymore. He would like to discuss options.    Subjective      Review of Systems:   Review of Systems   Constitutional:  Negative for fever.   HENT:  Negative for ear pain and sore throat.    Respiratory:  Negative for cough.    Cardiovascular:  Negative for chest pain.   Gastrointestinal:  Negative for abdominal pain, constipation, diarrhea, nausea and vomiting.        Past Medical History:   Past Medical History:   Diagnosis Date    Allergic     Anxiety     Depression     GERD (gastroesophageal reflux disease)     Hypertension     Obesity        Past Surgical History:   Past Surgical History:   Procedure Laterality Date    COLONOSCOPY N/A 2024    Procedure: COLONOSCOPY;  Surgeon: Chris Villegas MD;  Location: [facility] ENDOSCOPY;  Service: General;  Laterality: N/A;  DIVERTICULOSIS    FRACTURE SURGERY Right     leg       Family History:   Family History   Problem Relation Age of Onset    Heart disease Mother     Hypertension Mother     Cancer Maternal Grandfather     Heart disease Paternal Grandmother        Social History:   Social History     Socioeconomic History    Marital status: Single   Tobacco Use    Smoking status: Former      "Types: Cigarettes     Passive exposure: Never    Smokeless tobacco: Never   Vaping Use    Vaping status: Never Used   Substance and Sexual Activity    Alcohol use: Not Currently    Drug use: Not Currently    Sexual activity: Defer       Medications:     Current Outpatient Medications:     atorvastatin (LIPITOR) 80 MG tablet, Take 1 tablet by mouth every night at bedtime., Disp: 90 tablet, Rfl: 1    fluticasone (FLONASE) 50 MCG/ACT nasal spray, 2 sprays into the nostril(s) as directed by provider Daily., Disp: 18.2 mL, Rfl: 0    gabapentin (NEURONTIN) 300 MG capsule, Take 1 capsule by mouth 3 (Three) Times a Day., Disp: 270 capsule, Rfl: 1    lidocaine (LIDODERM) 5 %, Place 3 patches on the skin as directed by provider Daily. Remove & Discard patch within 12 hours or as directed by MD, Disp: 90 each, Rfl: 5    meloxicam (MOBIC) 15 MG tablet, Take 1 tablet by mouth Daily., Disp: 90 tablet, Rfl: 1    Semaglutide-Weight Management (Wegovy) 0.25 MG/0.5ML solution auto-injector, Inject 0.5 mL under the skin into the appropriate area as directed 1 (One) Time Per Week., Disp: 2 mL, Rfl: 0    Allergies:   Allergies   Allergen Reactions    Adhesive Tape Itching               Objective     Physical Exam:  Vital Signs:   Vitals:    04/12/24 0718   BP: 121/73   Pulse: 72   Temp: 97.7 °F (36.5 °C)   SpO2: 96%   Weight: (!) 159 kg (350 lb)   Height: 185.4 cm (73\")     Body mass index is 46.18 kg/m².           Physical Exam  HENT:      Right Ear: Tympanic membrane normal.      Left Ear: Tympanic membrane normal.      Nose: Nose normal.      Mouth/Throat:      Mouth: Mucous membranes are moist.   Eyes:      Conjunctiva/sclera: Conjunctivae normal.   Neck:      Vascular: No carotid bruit.   Cardiovascular:      Rate and Rhythm: Normal rate and regular rhythm.      Heart sounds: Normal heart sounds. No murmur heard.  Pulmonary:      Effort: Pulmonary effort is normal.      Breath sounds: Normal breath sounds.   Abdominal:      " General: Bowel sounds are normal.      Palpations: Abdomen is soft.   Musculoskeletal:      Right lower leg: No edema.      Left lower leg: No edema.   Skin:     General: Skin is warm and dry.   Neurological:      Mental Status: He is alert.   Psychiatric:         Mood and Affect: Mood normal.         Behavior: Behavior normal.           Assessment / Plan      Assessment/Plan:   Diagnoses and all orders for this visit:    1. Primary hypertension (Primary)    2. Mixed hyperlipidemia  -     atorvastatin (LIPITOR) 80 MG tablet; Take 1 tablet by mouth every night at bedtime.  Dispense: 90 tablet; Refill: 1    3. Impaired fasting glucose  -     Insulin, Random; Future    4. Class 3 severe obesity due to excess calories with serious comorbidity and body mass index (BMI) of 45.0 to 49.9 in adult    5. Cervical radiculitis  -     gabapentin (NEURONTIN) 300 MG capsule; Take 1 capsule by mouth 3 (Three) Times a Day.  Dispense: 270 capsule; Refill: 1    6. Neck pain  -     gabapentin (NEURONTIN) 300 MG capsule; Take 1 capsule by mouth 3 (Three) Times a Day.  Dispense: 270 capsule; Refill: 1  -     meloxicam (MOBIC) 15 MG tablet; Take 1 tablet by mouth Daily.  Dispense: 90 tablet; Refill: 1    7. DDD (degenerative disc disease), cervical  -     meloxicam (MOBIC) 15 MG tablet; Take 1 tablet by mouth Daily.  Dispense: 90 tablet; Refill: 1    8. Medication management  -     POC 12 Panel Urine Drug Screen    Other orders  -     Semaglutide-Weight Management (Wegovy) 0.25 MG/0.5ML solution auto-injector; Inject 0.5 mL under the skin into the appropriate area as directed 1 (One) Time Per Week.  Dispense: 2 mL; Refill: 0         Hypertension controlled with diet and exercise at this time  Hyperlipidemia will obtain lipid panel and CMP to monitor current statin dose Lipitor 80 mg at nighttime denies myalgias tolerating dose well  Impaired fasting glucose recommend reduce added carbs sugars exercise 30 minutes daily weight loss will  "obtain insulin level to monitor  Class III obesity with comorbidity of hyperlipidemia will start Wegovy recommend reducing overall caloric intake increase water intake increase exercise to 30 minutes daily  Cervical radiculitis neck pain will provide refill of gabapentin Gino reviewed urine drug screen obtained in office  Degenerative disc disease in cervical spine will provide refill meloxicam recommend take with food      Follow Up:   Return in about 6 months (around 10/12/2024).    Zonia Barnes, APRN    \"Please note that portions of this note were completed with a voice recognition program.\"    "

## 2024-04-16 RX ORDER — ATORVASTATIN CALCIUM 40 MG/1
40 TABLET, FILM COATED ORAL DAILY
Qty: 90 TABLET | Refills: 1 | Status: SHIPPED | OUTPATIENT
Start: 2024-04-16

## 2024-04-18 LAB — INSULIN SERPL-ACNC: 26 UIU/ML

## (undated) DEVICE — LINER SURG CANSTR SXN S/RIGD 1500CC

## (undated) DEVICE — SOLIDIFIER LIQLOC PLS 1500CC BT

## (undated) DEVICE — CONN JET HYDRA H20 AUXILIARY DISP

## (undated) DEVICE — STERILE POLYISOPRENE POWDER-FREE SURGICAL GLOVES WITH EMOLLIENT COATING: Brand: PROTEXIS

## (undated) DEVICE — Device: Brand: DEFENDO AIR/WATER/SUCTION AND BIOPSY VALVE

## (undated) DEVICE — STERILE POLYISOPRENE POWDER-FREE SURGICAL GLOVES: Brand: PROTEXIS

## (undated) DEVICE — Device

## (undated) DEVICE — SOL IRRG H2O PL/BG 1000ML STRL

## (undated) DEVICE — SOL IRR H2O BTL 1000ML STRL

## (undated) DEVICE — TUBING, SUCTION, 1/4" X 10', STRAIGHT: Brand: MEDLINE